# Patient Record
Sex: MALE | Race: WHITE | NOT HISPANIC OR LATINO | Employment: OTHER | URBAN - METROPOLITAN AREA
[De-identification: names, ages, dates, MRNs, and addresses within clinical notes are randomized per-mention and may not be internally consistent; named-entity substitution may affect disease eponyms.]

---

## 2023-02-10 ENCOUNTER — OFFICE VISIT (OUTPATIENT)
Dept: CARDIAC SURGERY | Facility: CLINIC | Age: 63
End: 2023-02-10

## 2023-02-10 VITALS
HEART RATE: 60 BPM | BODY MASS INDEX: 26.63 KG/M2 | SYSTOLIC BLOOD PRESSURE: 132 MMHG | OXYGEN SATURATION: 94 % | DIASTOLIC BLOOD PRESSURE: 70 MMHG | HEIGHT: 69 IN | WEIGHT: 179.8 LBS

## 2023-02-10 DIAGNOSIS — R93.1 ELEVATED CORONARY ARTERY CALCIUM SCORE: ICD-10-CM

## 2023-02-10 DIAGNOSIS — E78.2 MIXED HYPERLIPIDEMIA: ICD-10-CM

## 2023-02-10 DIAGNOSIS — Z82.49 FAMILY HISTORY OF CARDIOVASCULAR DISEASE: Primary | ICD-10-CM

## 2023-02-10 RX ORDER — ERGOCALCIFEROL 1.25 MG/1
50000 CAPSULE ORAL WEEKLY
COMMUNITY
Start: 2023-02-01

## 2023-02-10 RX ORDER — LOSARTAN POTASSIUM 50 MG/1
TABLET ORAL
COMMUNITY
Start: 2023-02-07

## 2023-02-10 RX ORDER — EZETIMIBE 10 MG/1
10 TABLET ORAL DAILY
COMMUNITY
Start: 2023-01-19

## 2023-02-10 RX ORDER — ROSUVASTATIN CALCIUM 40 MG/1
40 TABLET, COATED ORAL DAILY
COMMUNITY
Start: 2023-01-21

## 2023-02-10 NOTE — PROGRESS NOTES
Cardiology Consultation  Interventional Cardiology and [address] [place] [address],[address]  1960  [id number]  CARDIOVASC PHYSICIAN  [address] [address]  [phone]  [phone]    1  Family history of cardiovascular disease  POCT ECG    Comprehensive metabolic panel    Lipid panel      2  Mixed hyperlipidemia  Comprehensive metabolic panel    Lipid panel      3  Elevated coronary artery calcium score               Discussion/Summary    1  H/o mild CAD by cor calcium score >900 years ago in [place]  2  Hyperlipidemia with h/o low HDL   3  Essential hypertension, controlled    Plan  1) continue exercise and sensible/Mediterranean diet  2) will obtain records prior cardiologist in [place]  3) 45min spent with pt counseling on discussion  hyperlipidemia, CAD, manifestations, prevention vascular disease      History:     42-year-old male here for establishing cardiovascular care    Saw cardiology in [address] [place] [address] previously    Remote history of coronary calcium scoring which she states was over 900 but under 1000  Has been on combination rosuvastatin and Zetia  Approximately 7 years ago was diagnosed with essential hypertension at the time of daughter attempting to commit suicide which was an anxious time for obvious reasons  He has not been off blood pressure medicine since but it has been well controlled    No history of clinical vascular disease    Mother had carotid artery stenosis    No history rheumatic fever or childhood murmur  No prior syncope  Feels well  Patient Active Problem List   Diagnosis   • Mixed hyperlipidemia   • Elevated coronary artery calcium score     History reviewed  No pertinent past medical history    Social History     Socioeconomic History   • Marital status: /Civil Union     Spouse name: Not on file   • Number of children: Not on file   • Years of education: Not on file   • Highest education level: Not on file   Occupational History   • Not on file   Tobacco Use   • Smoking status: Never     Passive exposure: Never   • Smokeless tobacco: Never   Vaping Use   • Vaping Use: Never used   Substance and Sexual Activity   • Alcohol use: Yes     Alcohol/week: 2 0 standard drinks     Types: 1 Glasses of wine, 1 Cans of beer per week     Comment: social   • Drug use: Never   • Sexual activity: Not on file   Other Topics Concern   • Not on file   Social History Narrative   • Not on file     Social Determinants of Health     Financial Resource Strain: Not on file   Food Insecurity: Not on file   Transportation Needs: Not on file   Physical Activity: Not on file   Stress: Not on file   Social Connections: Not on file   Intimate Partner Violence: Not on file   Housing Stability: Not on file      Family History   Problem Relation Age of Onset   • Heart attack Mother    • Hyperlipidemia Mother    • Hypertension Mother    • Heart disease Mother    • Hyperlipidemia Brother      History reviewed  No pertinent surgical history      Current Outpatient Medications:   •  ergocalciferol (VITAMIN D2) 50,000 units, Take 50,000 Units by mouth once a week, Disp: , Rfl:   •  ezetimibe (ZETIA) 10 mg tablet, Take 10 mg by mouth daily, Disp: , Rfl:   •  losartan (COZAAR) 50 mg tablet, , Disp: , Rfl:   •  rosuvastatin (CRESTOR) 40 MG tablet, Take 40 mg by mouth daily, Disp: , Rfl:   No Known Allergies    Social, Family and medication history as listed, reviewed and updated as necessary    Labs: No results found for: NA, K, CL, CO2, BUN, CREATININE, GLUCOSE, CALCIUM    No results found for: WBC, HGB, HCT, PLT    No results found for: CHOL  No results found for: HDL  No results found for: LDLCALC  No results found for: TRIG  No results found for: LDLDIRECT    No results found for: ALT, AST          No results found for: NTBNP    No results found for: HGBA1C    Imaging: Reviewed in epic      Review of Systems:  14 systems reviewed and negative with exception of the above       PHYSICAL EXAM:        Vitals:    02/10/23 1357   BP: 132/70   Pulse: 60   SpO2: 94%     Body mass index is 26 55 kg/m²  Weight (last 2 days)     Date/Time Weight    02/10/23 1357 81 6 (179 8)             Gen: No acute distress  HEENT: anicteric, mucous membranes moist  Neck: supple, no jugular venous distention, or carotid bruit  Heart: regular, normal s1 and s2, no murmur/rub or gallop  Lungs :clear to auscultation bilaterally, no rales/rhonchi or wheeze  Abdomen: soft nontender, normoactive bowel sounds, no organomegaly  Ext: warm and perfused, normal femoral pulses, no edema, or clubbing  Skin: warm, no rashes  Neuro: AAO x 3, no focal findings  Psychiatric: normal affect  Musculoskeletal: no obvious joint deformities  This note was completed in part utilizing [employer] direct voice recognition software  Grammatical errors, random word insertion, spelling mistakes, and incomplete sentences may be an occasional consequence of the system secondary to software limitations, ambient noise and hardware issues  At the time of dictation, efforts were made to edit, clarify and /or correct errors  Please read the chart carefully and recognize, using context, where substitutions have occurred  If you have any questions or concerns about the context, text or information contained within the body of this dictation, please contact myself, the provider, for further clarification

## 2024-02-29 ENCOUNTER — TELEPHONE (OUTPATIENT)
Dept: UROLOGY | Facility: AMBULATORY SURGERY CENTER | Age: 64
End: 2024-02-29

## 2024-02-29 NOTE — TELEPHONE ENCOUNTER
TT from Dr. Cloud about a patient being referred for elevated PSA around 6. Patient to be seen as soon as possible.    Called and spoke with patient. He will be out of the area tomorrow until next Sunday (3/10). Scheduled 3/11 at [facility]. Patient states his PSA was just done with his yearly physical and was just above 6. He will bring records with to his appt. Confirmed office address, time and date.

## 2024-03-11 ENCOUNTER — OFFICE VISIT (OUTPATIENT)
Dept: UROLOGY | Facility: CLINIC | Age: 64
End: 2024-03-11
Payer: COMMERCIAL

## 2024-03-11 VITALS
BODY MASS INDEX: 25.83 KG/M2 | HEIGHT: 69 IN | OXYGEN SATURATION: 96 % | SYSTOLIC BLOOD PRESSURE: 136 MMHG | WEIGHT: 174.4 LBS | HEART RATE: 65 BPM | DIASTOLIC BLOOD PRESSURE: 78 MMHG

## 2024-03-11 DIAGNOSIS — R97.20 ELEVATED PSA: ICD-10-CM

## 2024-03-11 PROCEDURE — 99204 OFFICE O/P NEW MOD 45 MIN: CPT | Performed by: UROLOGY

## 2024-03-11 RX ORDER — TESTOSTERONE 10 MG/.5G
GEL, METERED TOPICAL
COMMUNITY
Start: 2023-03-01

## 2024-03-11 NOTE — PROGRESS NOTES
3/11/2024    Mic Richter  1960  [id number]        Assessment  Elevated PSA of 6.71, family history of prostate cancer, elevated PSA velocity, history of hypogonadism on testosterone replacement therapy, asymptomatic incidental right inguinal hernia      Discussion  Today I discussed with the patient the significance of the elevated PSA in addition to a positive family history for prostate cancer puts the patient at a greater risk for prostate cancer.  I recommend repeating the PSA level at this time.  In addition I have preemptively scheduled an MRI of the prostate for restratification for prostate cancer.  I provided the patient with reassurance that his prostate is normal on digital rectal examination today, the MRI will also help to obtain prostate size for PSA density.  I recommend that he continue to hold testosterone replacement therapy until after MRI of the prostate and further discussion regarding risk for prostate cancer.  If the MRI reveals a high PI-RADS score we then discussed possibly performing a fusion biopsy.  I would hold on intervention for his asymptomatic right inguinal hernia as the patient was unaware of this finding and has no pain or discomfort.      History of Present Illness  63 y.o. male with a history of an elevated PSA of 6.71( 2/17/23) which is an increase from a previous 1.9.  Testing was performed at [facility].  Patient disclosed a family history of prostate cancer with his father having prostate cancer, which was successfully treated. Patient denies any lower urinary tract symptoms and reports going to the restroom 1 to 2 times in the middle of the night. Denies any sexual dysfunction. Patient disclosed that he was previously using testosterone replacement therapy due to low testosterone. However, the patient notes that he has recently stopped using it when his elevated PSA was identified.          AUA Symptom Score  AUA SYMPTOM SCORE      Flowsheet Row Most Recent  Value   AUA SYMPTOM SCORE    How often have you had a sensation of not emptying your bladder completely after you finished urinating? 1 (P)    How often have you had to urinate again less than two hours after you finished urinating? 1 (P)    How often have you found you stopped and started again several times when you urinate? 2 (P)    How often have you found it difficult to postpone urination? 0 (P)    How often have you had a weak urinary stream? 2 (P)    How often have you had to push or strain to begin urination? 1 (P)    How many times did you most typically get up to urinate from the time you went to bed at night until the time you got up in the morning? 3 (P)    Quality of Life: If you were to spend the rest of your life with your urinary condition just the way it is now, how would you feel about that? 1 (P)    AUA SYMPTOM SCORE 10 (P)             Review of Systems  Review of Systems   Constitutional: Negative.  Negative for chills and fever.   HENT: Negative.  Negative for ear pain and sore throat.    Eyes: Negative.  Negative for pain and visual disturbance.   Respiratory: Negative.  Negative for cough and shortness of breath.    Cardiovascular: Negative.  Negative for chest pain and palpitations.   Gastrointestinal: Negative.  Negative for abdominal pain and vomiting.   Endocrine: Negative.    Genitourinary:  Negative for dysuria and hematuria.        Per HPI   Musculoskeletal: Negative.  Negative for arthralgias and back pain.   Skin: Negative.  Negative for color change and rash.   Allergic/Immunologic: Negative.    Neurological: Negative.  Negative for seizures and syncope.   Hematological: Negative.    Psychiatric/Behavioral: Negative.     All other systems reviewed and are negative.        Past Medical History  No past medical history on file.    Past Social History  No past surgical history on file.    Past Family History  Family History   Problem Relation Age of Onset    Heart attack Mother      Hyperlipidemia Mother     Hypertension Mother     Heart disease Mother     Hyperlipidemia Brother        Past Social history  Social History     Socioeconomic History    Marital status: /Civil Union     Spouse name: Not on file    Number of children: Not on file    Years of education: Not on file    Highest education level: Not on file   Occupational History    Not on file   Tobacco Use    Smoking status: Never     Passive exposure: Never    Smokeless tobacco: Never   Vaping Use    Vaping status: Never Used   Substance and Sexual Activity    Alcohol use: Yes     Alcohol/week: 2.0 standard drinks of alcohol     Types: 1 Glasses of wine, 1 Cans of beer per week     Comment: social    Drug use: Never    Sexual activity: Not on file   Other Topics Concern    Not on file   Social History Narrative    Not on file     Social Determinants of Health     Financial Resource Strain: Not on file   Food Insecurity: Not on file   Transportation Needs: Not on file   Physical Activity: Not on file   Stress: Not on file   Social Connections: Not on file   Intimate Partner Violence: Not on file   Housing Stability: Not on file       Current Medications  Current Outpatient Medications   Medication Sig Dispense Refill    ergocalciferol (VITAMIN D2) 50,000 units Take 50,000 Units by mouth once a week      ezetimibe (ZETIA) 10 mg tablet Take 10 mg by mouth daily      losartan (COZAAR) 50 mg tablet       rosuvastatin (CRESTOR) 40 MG tablet Take 40 mg by mouth daily       No current facility-administered medications for this visit.       Allergies  No Known Allergies    Past Medical History, Social History, Family History, medications and allergies were reviewed.    Vitals  There were no vitals filed for this visit.    Physical Exam    On examination he is in no acute distress.  His abdomen is soft nontender nondistended.  Right lower quadrant abdominal scar from a pocket for an amputated finger is identified.  His left thumb has  "since been sewn back on into proper anatomic position.  Phallus is normal.  Testes are normal.  A right inguinal hernia is appreciated and easily reducible.  There is a 1 cm right epididymal head cyst.  Digital rectal examination reveals a benign 35 to 40 g prostate which is soft without nodularity.  Skin is warm.  Extremities without edema.  Neurologic is grossly intact and nonfocal.  Gait normal.  Affect normal      Results  No results found for: \"PSA\"  No results found for: \"GLUCOSE\", \"CALCIUM\", \"NA\", \"K\", \"CO2\", \"CL\", \"BUN\", \"CREATININE\"  No results found for: \"WBC\", \"HGB\", \"HCT\", \"MCV\", \"PLT\"      Office Urine Dip  No results found for this or any previous visit (from the past 1 hour(s)).]            "

## 2024-03-11 NOTE — LETTER
March 11, 2024     Dorothy Jc MD  [address] [facility]  [place] [address]    Patient: Mic Richter   YOB: 1960   Date of Visit: 3/11/2024       Dear Dr. Jc:    Thank you for referring Mic Richter to me for evaluation. Below are my notes for this consultation.    If you have questions, please do not hesitate to call me. I look forward to following your patient along with you.         Sincerely,        Terrance Cloud MD        CC: No Recipients    Terrance Cloud MD  3/11/2024 12:33 PM  Sign when Signing Visit  3/11/2024    Mic Richter  1960  [id number]        Assessment  Elevated PSA of 6.71, family history of prostate cancer, elevated PSA velocity, history of hypogonadism on testosterone replacement therapy, asymptomatic incidental right inguinal hernia      Discussion  Today I discussed with the patient the significance of the elevated PSA in addition to a positive family history for prostate cancer puts the patient at a greater risk for prostate cancer.  I recommend repeating the PSA level at this time.  In addition I have preemptively scheduled an MRI of the prostate for restratification for prostate cancer.  I provided the patient with reassurance that his prostate is normal on digital rectal examination today, the MRI will also help to obtain prostate size for PSA density.  I recommend that he continue to hold testosterone replacement therapy until after MRI of the prostate and further discussion regarding risk for prostate cancer.  If the MRI reveals a high PI-RADS score we then discussed possibly performing a fusion biopsy.  I would hold on intervention for his asymptomatic right inguinal hernia as the patient was unaware of this finding and has no pain or discomfort.      History of Present Illness  63 y.o. male with a history of an elevated PSA of 6.71( 2/17/23) which is an increase from a previous 1.9.  Testing was performed at [facility]  laboratory.  Patient disclosed a family history of prostate cancer with his father having prostate cancer, which was successfully treated. Patient denies any lower urinary tract symptoms and reports going to the restroom 1 to 2 times in the middle of the night. Denies any sexual dysfunction. Patient disclosed that he was previously using testosterone replacement therapy due to low testosterone. However, the patient notes that he has recently stopped using it when his elevated PSA was identified.          AUA Symptom Score  AUA SYMPTOM SCORE      Flowsheet Row Most Recent Value   AUA SYMPTOM SCORE    How often have you had a sensation of not emptying your bladder completely after you finished urinating? 1 (P)    How often have you had to urinate again less than two hours after you finished urinating? 1 (P)    How often have you found you stopped and started again several times when you urinate? 2 (P)    How often have you found it difficult to postpone urination? 0 (P)    How often have you had a weak urinary stream? 2 (P)    How often have you had to push or strain to begin urination? 1 (P)    How many times did you most typically get up to urinate from the time you went to bed at night until the time you got up in the morning? 3 (P)    Quality of Life: If you were to spend the rest of your life with your urinary condition just the way it is now, how would you feel about that? 1 (P)    AUA SYMPTOM SCORE 10 (P)             Review of Systems  Review of Systems   Constitutional: Negative.  Negative for chills and fever.   HENT: Negative.  Negative for ear pain and sore throat.    Eyes: Negative.  Negative for pain and visual disturbance.   Respiratory: Negative.  Negative for cough and shortness of breath.    Cardiovascular: Negative.  Negative for chest pain and palpitations.   Gastrointestinal: Negative.  Negative for abdominal pain and vomiting.   Endocrine: Negative.    Genitourinary:  Negative for dysuria and  hematuria.        Per HPI   Musculoskeletal: Negative.  Negative for arthralgias and back pain.   Skin: Negative.  Negative for color change and rash.   Allergic/Immunologic: Negative.    Neurological: Negative.  Negative for seizures and syncope.   Hematological: Negative.    Psychiatric/Behavioral: Negative.     All other systems reviewed and are negative.        Past Medical History  No past medical history on file.    Past Social History  No past surgical history on file.    Past Family History  Family History   Problem Relation Age of Onset   • Heart attack Mother    • Hyperlipidemia Mother    • Hypertension Mother    • Heart disease Mother    • Hyperlipidemia Brother        Past Social history  Social History     Socioeconomic History   • Marital status: /Civil Union     Spouse name: Not on file   • Number of children: Not on file   • Years of education: Not on file   • Highest education level: Not on file   Occupational History   • Not on file   Tobacco Use   • Smoking status: Never     Passive exposure: Never   • Smokeless tobacco: Never   Vaping Use   • Vaping status: Never Used   Substance and Sexual Activity   • Alcohol use: Yes     Alcohol/week: 2.0 standard drinks of alcohol     Types: 1 Glasses of wine, 1 Cans of beer per week     Comment: social   • Drug use: Never   • Sexual activity: Not on file   Other Topics Concern   • Not on file   Social History Narrative   • Not on file     Social Determinants of Health     Financial Resource Strain: Not on file   Food Insecurity: Not on file   Transportation Needs: Not on file   Physical Activity: Not on file   Stress: Not on file   Social Connections: Not on file   Intimate Partner Violence: Not on file   Housing Stability: Not on file       Current Medications  Current Outpatient Medications   Medication Sig Dispense Refill   • ergocalciferol (VITAMIN D2) 50,000 units Take 50,000 Units by mouth once a week     • ezetimibe (ZETIA) 10 mg tablet Take 10  "mg by mouth daily     • losartan (COZAAR) 50 mg tablet      • rosuvastatin (CRESTOR) 40 MG tablet Take 40 mg by mouth daily       No current facility-administered medications for this visit.       Allergies  No Known Allergies    Past Medical History, Social History, Family History, medications and allergies were reviewed.    Vitals  There were no vitals filed for this visit.    Physical Exam    On examination he is in no acute distress.  His abdomen is soft nontender nondistended.  Right lower quadrant abdominal scar from a pocket for an amputated finger is identified.  His left thumb has since been sewn back on into proper anatomic position.  Phallus is normal.  Testes are normal.  A right inguinal hernia is appreciated and easily reducible.  There is a 1 cm right epididymal head cyst.  Digital rectal examination reveals a benign 35 to 40 g prostate which is soft without nodularity.  Skin is warm.  Extremities without edema.  Neurologic is grossly intact and nonfocal.  Gait normal.  Affect normal      Results  No results found for: \"PSA\"  No results found for: \"GLUCOSE\", \"CALCIUM\", \"NA\", \"K\", \"CO2\", \"CL\", \"BUN\", \"CREATININE\"  No results found for: \"WBC\", \"HGB\", \"HCT\", \"MCV\", \"PLT\"      Office Urine Dip  No results found for this or any previous visit (from the past 1 hour(s)).]            "

## 2024-03-12 ENCOUNTER — APPOINTMENT (OUTPATIENT)
Dept: LAB | Facility: CLINIC | Age: 64
End: 2024-03-12
Payer: COMMERCIAL

## 2024-03-12 DIAGNOSIS — R97.20 ELEVATED PSA: ICD-10-CM

## 2024-03-12 LAB
ANION GAP SERPL CALCULATED.3IONS-SCNC: 8 MMOL/L (ref 4–13)
BUN SERPL-MCNC: 15 MG/DL (ref 5–25)
CALCIUM SERPL-MCNC: 9.2 MG/DL (ref 8.4–10.2)
CHLORIDE SERPL-SCNC: 106 MMOL/L (ref 96–108)
CO2 SERPL-SCNC: 26 MMOL/L (ref 21–32)
CREAT SERPL-MCNC: 0.86 MG/DL (ref 0.6–1.3)
GFR SERPL CREATININE-BSD FRML MDRD: 93 ML/MIN/1.73SQ M
GLUCOSE P FAST SERPL-MCNC: 97 MG/DL (ref 65–99)
POTASSIUM SERPL-SCNC: 4.4 MMOL/L (ref 3.5–5.3)
PSA SERPL-MCNC: 3.35 NG/ML (ref 0–4)
SODIUM SERPL-SCNC: 140 MMOL/L (ref 135–147)

## 2024-03-12 PROCEDURE — 36415 COLL VENOUS BLD VENIPUNCTURE: CPT

## 2024-03-12 PROCEDURE — 80048 BASIC METABOLIC PNL TOTAL CA: CPT

## 2024-03-12 PROCEDURE — 84153 ASSAY OF PSA TOTAL: CPT

## 2024-03-25 ENCOUNTER — HOSPITAL ENCOUNTER (OUTPATIENT)
Facility: MEDICAL CENTER | Age: 64
Discharge: HOME | End: 2024-03-25
Payer: COMMERCIAL

## 2024-03-25 DIAGNOSIS — R97.20 ELEVATED PSA: ICD-10-CM

## 2024-03-25 PROCEDURE — 76377 3D RENDER W/INTRP POSTPROCES: CPT

## 2024-03-25 PROCEDURE — A9585 GADOBUTROL INJECTION: HCPCS

## 2024-03-25 PROCEDURE — 72197 MRI PELVIS W/O & W/DYE: CPT

## 2024-03-25 RX ORDER — GADOBUTROL 604.72 MG/ML
7 INJECTION INTRAVENOUS
Status: COMPLETED | OUTPATIENT
Start: 2024-03-25 | End: 2024-03-25

## 2024-03-25 RX ADMIN — GADOBUTROL 7 ML: 604.72 INJECTION INTRAVENOUS at 10:49

## 2024-04-03 ENCOUNTER — OFFICE VISIT (OUTPATIENT)
Dept: CARDIAC SURGERY | Facility: CLINIC | Age: 64
End: 2024-04-03
Payer: COMMERCIAL

## 2024-04-03 ENCOUNTER — TELEPHONE (OUTPATIENT)
Age: 64
End: 2024-04-03

## 2024-04-03 VITALS
OXYGEN SATURATION: 99 % | RESPIRATION RATE: 18 BRPM | WEIGHT: 175 LBS | SYSTOLIC BLOOD PRESSURE: 120 MMHG | DIASTOLIC BLOOD PRESSURE: 74 MMHG | HEIGHT: 69 IN | HEART RATE: 57 BPM | BODY MASS INDEX: 25.92 KG/M2

## 2024-04-03 DIAGNOSIS — Z82.49 FAMILY HISTORY OF CARDIOVASCULAR DISEASE: Primary | ICD-10-CM

## 2024-04-03 DIAGNOSIS — R93.1 ELEVATED CORONARY ARTERY CALCIUM SCORE: ICD-10-CM

## 2024-04-03 DIAGNOSIS — E78.2 MIXED HYPERLIPIDEMIA: ICD-10-CM

## 2024-04-03 PROCEDURE — 99214 OFFICE O/P EST MOD 30 MIN: CPT | Performed by: INTERNAL MEDICINE

## 2024-04-03 PROCEDURE — 93000 ELECTROCARDIOGRAM COMPLETE: CPT | Performed by: INTERNAL MEDICINE

## 2024-04-03 RX ORDER — ASPIRIN 81 MG/1
81 TABLET ORAL DAILY
COMMUNITY

## 2024-04-03 RX ORDER — BLOOD-GLUCOSE SENSOR
EACH MISCELLANEOUS
COMMUNITY
Start: 2024-03-15

## 2024-04-03 NOTE — PROGRESS NOTES
Cardiology Consultation  [facility]      Mic Richter  1960  [id number]  CARDIOVASC PHYSICIAN  [address] [place] [id number]  [phone]  [phone]    1. Family history of cardiovascular disease  POCT ECG      2. Elevated coronary artery calcium score        3. Mixed hyperlipidemia               Discussion/Summary:    1. H/o mild CAD by cor calcium score >900 years ago in [facility]  2. Hyperlipidemia with overall controlled LDL and normal CRP  3. Essential hypertension, controlled    Plan:   1) patient overall doing well.  No cardiac testing or changes in medications are required from my standpoint at this time.  Encouraged continued sensible/Mediterranean type diet and daily activity/exercise.  Annual follow-up arranged.      Face to face pio: 15minutes  Review data: 5 minutes  Documentation: 10 minutes    History:     Mr. Richter is a 63-year-old male here for cardiology follow up.     Remote history of coronary calcium scoring which she states was over 900 but under 1000.    Has been on combination rosuvastatin and Zetia.  Recent cholesterol profile with total cholesterol 141, LDL 85, HDL 43.  CRP less than 0.3.    Blood Pressure has been controlled on Cozaar.    He feels well.  Plays paddle tennis and golfs and walks when he golfs.  No symptoms of shortness of breath PND orthopnea.  No chest discomfort.    Patient Active Problem List   Diagnosis    Mixed hyperlipidemia    Elevated coronary artery calcium score     History reviewed. No pertinent past medical history.  Social History     Socioeconomic History    Marital status: /Civil Union     Spouse name: Not on file    Number of children: Not on file    Years of education: Not on file    Highest education level: Not on file   Occupational History    Not on file   Tobacco Use    Smoking status: Never     Passive exposure: Never    Smokeless tobacco: Never   Vaping Use    Vaping status: Never Used  "  Substance and Sexual Activity    Alcohol use: Yes     Alcohol/week: 2.0 standard drinks of alcohol     Types: 1 Glasses of wine, 1 Cans of beer per week     Comment: social    Drug use: Never    Sexual activity: Not on file   Other Topics Concern    Not on file   Social History Narrative    Not on file     Social Determinants of Health     Financial Resource Strain: Not on file   Food Insecurity: Not on file   Transportation Needs: Not on file   Physical Activity: Not on file   Stress: Not on file   Social Connections: Not on file   Intimate Partner Violence: Not on file   Housing Stability: Not on file      Family History   Problem Relation Age of Onset    Heart attack Mother     Hyperlipidemia Mother     Hypertension Mother     Heart disease Mother     Hyperlipidemia Brother      History reviewed. No pertinent surgical history.    Current Outpatient Medications:     aspirin (ECOTRIN LOW STRENGTH) 81 mg EC tablet, Take 81 mg by mouth daily, Disp: , Rfl:     Continuous Blood Gluc Sensor (FreeStyle [place] 3 Sensor) MISC, HERNESTO 3 APPLY TO AFFECTED AREA SQ DAILY 14 DAYS, Disp: , Rfl:     ergocalciferol (VITAMIN D2) 50,000 units, Take 50,000 Units by mouth once a week, Disp: , Rfl:     ezetimibe (ZETIA) 10 mg tablet, Take 10 mg by mouth daily, Disp: , Rfl:     losartan (COZAAR) 50 mg tablet, , Disp: , Rfl:     rosuvastatin (CRESTOR) 40 MG tablet, Take 40 mg by mouth daily, Disp: , Rfl:     Testosterone 10 MG/ACT (2%) GEL, , Disp: , Rfl:   No Known Allergies    Social, Family and medication history as listed, reviewed and updated as necessary    Labs:   Lab Results   Component Value Date    K 4.4 03/12/2024     03/12/2024    CO2 26 03/12/2024    BUN 15 03/12/2024    CREATININE 0.86 03/12/2024    CALCIUM 9.2 03/12/2024       No results found for: \"WBC\", \"HGB\", \"HCT\", \"PLT\"    No results found for: \"CHOL\"  No results found for: \"HDL\"  No results found for: \"LDLCALC\"  No results found for: \"TRIG\"  No results found " "for: \"LDLDIRECT\"    No results found for: \"ALT\", \"AST\", \"ALKPHOS\"          No results found for: \"NTBNP\"    No results found for: \"HGBA1C\"    Imaging: Reviewed in epic      Review of Systems:  14 systems reviewed and negative with exception of the above       PHYSICAL EXAM:        Vitals:    04/03/24 1241   BP: 120/74   Pulse: 57   Resp: 18   SpO2: 99%     Body mass index is 25.84 kg/m².  Weight (last 2 days)       Date/Time Weight    04/03/24 1241 79.4 (175)               Gen: No acute distress  HEENT: anicteric, mucous membranes moist  Neck: supple, no jugular venous distention, or carotid bruit  Heart: regular, normal s1 and s2, no murmur/rub or gallop  Lungs :clear to auscultation bilaterally, no rales/rhonchi or wheeze  Abdomen: soft nontender, normoactive bowel sounds, no organomegaly  Ext: warm and perfused, normal femoral pulses, no edema, or clubbing  Skin: warm, no rashes  Neuro: AAO x 3, no focal findings  Psychiatric: normal affect  Musculoskeletal: no obvious joint deformities.        This note was completed in part utilizing [employer] direct voice recognition software.   Grammatical errors, random word insertion, spelling mistakes, and incomplete sentences may be an occasional consequence of the system secondary to software limitations, ambient noise and hardware issues. At the time of dictation, efforts were made to edit, clarify and /or correct errors.  Please read the chart carefully and recognize, using context, where substitutions have occurred.  If you have any questions or concerns about the context, text or information contained within the body of this dictation, please contact myself, the provider, for further clarification.       "

## 2024-04-03 NOTE — TELEPHONE ENCOUNTER
Patient only seen once.  Office note, MRI and PSA report faxed to [phone] and received confirmation.

## 2024-04-03 NOTE — TELEPHONE ENCOUNTER
Carmenza from [facility] office was calling in regards to the fax requested from earlier today. They did receive the PSA and OV notes. But they did not get the MRI results. Can this please be faxed to [phone]

## 2024-04-03 NOTE — TELEPHONE ENCOUNTER
Carmenza from [facility] calling to request the mutual pt's MRI, PSA and recent OV notes be faxed to their office directly. Would like to have this information prior to his 4/17 appt.   Fax number: [phone]

## 2024-04-04 ENCOUNTER — TELEPHONE (OUTPATIENT)
Dept: UROLOGY | Facility: AMBULATORY SURGERY CENTER | Age: 64
End: 2024-04-04

## 2024-04-04 DIAGNOSIS — R97.20 ELEVATED PSA: Primary | ICD-10-CM

## 2024-06-11 DIAGNOSIS — R93.1 ELEVATED CORONARY ARTERY CALCIUM SCORE: Primary | ICD-10-CM

## 2024-06-11 RX ORDER — LOSARTAN POTASSIUM 50 MG/1
50 TABLET ORAL DAILY
Qty: 90 TABLET | Refills: 1 | Status: SHIPPED | OUTPATIENT
Start: 2024-06-11

## 2024-06-11 NOTE — TELEPHONE ENCOUNTER
Reason for call:   [x] Refill   [] Prior Auth  [] Other:     Office:   [] PCP/Provider -   [x] Specialty/Provider -   Chris Bender,   Cardiology    Medication:   losartan (COZAAR) 50 mg tablet- one daily # 90      Pharmacy:[facility]/pharmacy #[id number] - [place] [address]     Does the patient have enough for 3 days?   \\ Yes   [] No - Send as HP to POD

## 2024-07-16 ENCOUNTER — APPOINTMENT (OUTPATIENT)
Dept: LAB | Facility: CLINIC | Age: 64
End: 2024-07-16
Payer: COMMERCIAL

## 2024-07-16 DIAGNOSIS — R97.20 ELEVATED PSA: ICD-10-CM

## 2024-07-16 LAB — PSA SERPL-MCNC: 2.2 NG/ML (ref 0–4)

## 2024-07-16 PROCEDURE — 84153 ASSAY OF PSA TOTAL: CPT

## 2024-07-16 PROCEDURE — 36415 COLL VENOUS BLD VENIPUNCTURE: CPT

## 2024-09-24 ENCOUNTER — TELEPHONE (OUTPATIENT)
Age: 64
End: 2024-09-24

## 2024-10-01 ENCOUNTER — OFFICE VISIT (OUTPATIENT)
Dept: UROLOGY | Facility: AMBULATORY SURGERY CENTER | Age: 64
End: 2024-10-01
Payer: COMMERCIAL

## 2024-10-01 VITALS
HEIGHT: 69 IN | OXYGEN SATURATION: 98 % | SYSTOLIC BLOOD PRESSURE: 112 MMHG | BODY MASS INDEX: 25.92 KG/M2 | DIASTOLIC BLOOD PRESSURE: 68 MMHG | RESPIRATION RATE: 16 BRPM | WEIGHT: 175 LBS | HEART RATE: 85 BPM

## 2024-10-01 DIAGNOSIS — Z12.5 SCREENING FOR PROSTATE CANCER: Primary | ICD-10-CM

## 2024-10-01 PROCEDURE — 99213 OFFICE O/P EST LOW 20 MIN: CPT

## 2024-10-01 NOTE — ASSESSMENT & PLAN NOTE
Patient has a positive family history of prostate cancer  Patient had an elevated PSA as high of 6.71 which prompted a multiparametric MRI of the prostate.  Multiparametric MRI of the prostate was performed 3/25/2024 and patient was graded as PI-RADS category 2 with a prostate size of 37 g.  The patient has had 2 PSAs tested since his MRI and he continues to drop to his normal baseline.  First on 3/25/2024 returned as 3.35 and again on 7/16/2024 which returned as 2.198.  SONAL performed at his last office visit which was noted to be palpably benign.  We discussed that with his PSA dropping towards his previous baseline as well as a prostate MRI grade of 2, that the patient can return to annual prostate cancer screening with a PSA yearly and office follow-up with a SONAL.  Additionally, the patient can reinitiate his testosterone placement therapy which she was advised to stop until after his MRI of the prostate.  Patient will obtain a PSA 1 year from his last and follow-up in the office at that time to undergo a SONAL.    PSA Trend:  2.198 - 7/16/24  3.35 - 3/25/24  6.71 - 2/17/23

## 2024-10-01 NOTE — PROGRESS NOTES
10/1/2024      Assessment and Plan    64 y.o. male managed by Dr. Cloud    Screening for prostate cancer  Patient has a positive family history of prostate cancer  Patient had an elevated PSA as high of 6.71 which prompted a multiparametric MRI of the prostate.  Multiparametric MRI of the prostate was performed 3/25/2024 and patient was graded as PI-RADS category 2 with a prostate size of 37 g.  The patient has had 2 PSAs tested since his MRI and he continues to drop to his normal baseline.  First on 3/25/2024 returned as 3.35 and again on 7/16/2024 which returned as 2.198.  SONAL performed at his last office visit which was noted to be palpably benign.  We discussed that with his PSA dropping towards his previous baseline as well as a prostate MRI grade of 2, that the patient can return to annual prostate cancer screening with a PSA yearly and office follow-up with a SONAL.  Additionally, the patient can reinitiate his testosterone placement therapy which she was advised to stop until after his MRI of the prostate.  Patient will obtain a PSA 1 year from his last and follow-up in the office at that time to undergo a SONAL.    PSA Trend:  2.198 - 7/16/24  3.35 - 3/25/24  6.71 - 2/17/23         History of Present Illness  Mic Richter is a 64 y.o. male here for evaluation of history of an elevated PSA, history of hypogonadism on testosterone replacement therapy, and asymptomatic right inguinal hernia.  Patient has a positive family history of prostate cancer.  Patient's most recent PSA was performed 7/16/2024 and found to be 2.198.  Patient underwent multiparametric MRI of the prostate on 3/25/2024 which graded the patient has PI-RADS category 2 with a calculated prostate size of 37 g.  Patient is currently on testosterone 10 Mg/ACT (2%) gel for his testosterone placement therapy.  Today, the patient is overall doing very well and offers no new lower urinary tract complaints.  AUA symptom score 11.  Patient denies  "dysuria, hematuria, flank pain, feelings of incomplete bladder emptying, or weakened urinary stream.        Review of Systems   Constitutional:  Negative for chills and fever.   HENT:  Negative for ear pain and sore throat.    Eyes:  Negative for pain and visual disturbance.   Respiratory:  Negative for cough and shortness of breath.    Cardiovascular:  Negative for chest pain and palpitations.   Gastrointestinal:  Negative for abdominal pain and vomiting.   Genitourinary:  Negative for decreased urine volume, difficulty urinating, dysuria, flank pain, frequency, hematuria and urgency.   Musculoskeletal:  Negative for arthralgias and back pain.   Skin:  Negative for color change and rash.   Neurological:  Negative for seizures and syncope.   All other systems reviewed and are negative.          AUA SYMPTOM SCORE      Flowsheet Row Most Recent Value   AUA SYMPTOM SCORE    How often have you had a sensation of not emptying your bladder completely after you finished urinating? 2 (P)     How often have you had to urinate again less than two hours after you finished urinating? 2 (P)     How often have you found you stopped and started again several times when you urinate? 3 (P)     How often have you found it difficult to postpone urination? 0 (P)     How often have you had a weak urinary stream? 3 (P)     How often have you had to push or strain to begin urination? 0 (P)     How many times did you most typically get up to urinate from the time you went to bed at night until the time you got up in the morning? 1 (P)     Quality of Life: If you were to spend the rest of your life with your urinary condition just the way it is now, how would you feel about that? 1 (P)     AUA SYMPTOM SCORE 11 (P)               Vitals  Vitals:    10/01/24 1435   BP: 112/68   BP Location: Left arm   Patient Position: Sitting   Cuff Size: Adult   Pulse: 85   Resp: 16   SpO2: 98%   Weight: 79.4 kg (175 lb)   Height: 5' 9\" (1.753 m) "       Physical Exam  Vitals reviewed.   Constitutional:       General: He is not in acute distress.     Appearance: Normal appearance. He is not ill-appearing.   HENT:      Head: Normocephalic and atraumatic.      Nose: Nose normal.   Eyes:      General: No scleral icterus.  Pulmonary:      Effort: No respiratory distress.   Abdominal:      General: Abdomen is flat. There is no distension.      Palpations: Abdomen is soft.      Tenderness: There is no abdominal tenderness.   Musculoskeletal:         General: Normal range of motion.      Cervical back: Normal range of motion.   Skin:     General: Skin is warm.      Coloration: Skin is not jaundiced.   Neurological:      Mental Status: He is alert and oriented to person, place, and time.      Gait: Gait normal.   Psychiatric:         Mood and Affect: Mood normal.         Behavior: Behavior normal.           Past History  History reviewed. No pertinent past medical history.  Social History     Socioeconomic History    Marital status: /Civil Union     Spouse name: None    Number of children: None    Years of education: None    Highest education level: None   Occupational History    None   Tobacco Use    Smoking status: Never     Passive exposure: Never    Smokeless tobacco: Never   Vaping Use    Vaping status: Never Used   Substance and Sexual Activity    Alcohol use: Yes     Alcohol/week: 2.0 standard drinks of alcohol     Types: 1 Glasses of wine, 1 Cans of beer per week     Comment: social    Drug use: Never    Sexual activity: None   Other Topics Concern    None   Social History Narrative    None     Social Determinants of Health     Financial Resource Strain: Not on file   Food Insecurity: Not on file   Transportation Needs: Not on file   Physical Activity: Not on file   Stress: Not on file   Social Connections: Not on file   Intimate Partner Violence: Not on file   Housing Stability: Not on file     Social History     Tobacco Use   Smoking Status Never     "Passive exposure: Never   Smokeless Tobacco Never     Family History   Problem Relation Age of Onset    Heart attack Mother     Hyperlipidemia Mother     Hypertension Mother     Heart disease Mother     Hyperlipidemia Brother        The following portions of the patient's history were reviewed and updated as appropriate: allergies, current medications, past medical history, past social history, past surgical history and problem list.    Results  No results found for this or any previous visit (from the past 1 hour(s)).]  Lab Results   Component Value Date    PSA 2.198 07/16/2024    PSA 3.35 03/12/2024     Lab Results   Component Value Date    CALCIUM 9.2 03/12/2024    K 4.4 03/12/2024    CO2 26 03/12/2024     03/12/2024    BUN 15 03/12/2024    CREATININE 0.86 03/12/2024     No results found for: \"WBC\", \"HGB\", \"HCT\", \"MCV\", \"PLT\"   "

## 2024-10-31 PROBLEM — Z12.5 SCREENING FOR PROSTATE CANCER: Status: RESOLVED | Noted: 2024-10-01 | Resolved: 2024-10-31

## 2024-11-29 DIAGNOSIS — R93.1 ELEVATED CORONARY ARTERY CALCIUM SCORE: Primary | ICD-10-CM

## 2024-11-29 RX ORDER — EZETIMIBE 10 MG/1
10 TABLET ORAL DAILY
Qty: 90 TABLET | Refills: 3 | Status: SHIPPED | OUTPATIENT
Start: 2024-11-29

## 2024-11-29 RX ORDER — LOSARTAN POTASSIUM 50 MG/1
50 TABLET ORAL DAILY
Qty: 90 TABLET | Refills: 1 | Status: SHIPPED | OUTPATIENT
Start: 2024-11-29

## 2024-11-29 NOTE — TELEPHONE ENCOUNTER
Reason for call: Chris Bender (cardiology)manage these medications!    [x] Refill   [] Prior Auth  [] Other:     Office:   [] PCP/Provider -   [x] Specialty/Provider -     Medication: ezetimibe (ZETIA) 10 mg tablet     Dose/Frequency: Take 10 mg by mouth daily,     Quantity: 90 Tablets      Medication    losartan (COZAAR) 50 mg tablet       Does/Frequency:losartan (COZAAR) 50 mg tablet     [id number] tablet     Pharmacy: [facility]/pharmacy [address] [place], [place] [address] [address] [phone]     Does the patient have enough for 3 days?   [x] Yes   [] No - Send as HP to POD

## 2024-12-05 ENCOUNTER — APPOINTMENT (OUTPATIENT)
Dept: RADIOLOGY | Facility: OTHER | Age: 64
End: 2024-12-05
Payer: COMMERCIAL

## 2024-12-05 ENCOUNTER — OFFICE VISIT (OUTPATIENT)
Dept: OBGYN CLINIC | Facility: OTHER | Age: 64
End: 2024-12-05
Payer: COMMERCIAL

## 2024-12-05 VITALS
SYSTOLIC BLOOD PRESSURE: 129 MMHG | DIASTOLIC BLOOD PRESSURE: 73 MMHG | HEIGHT: 69 IN | HEART RATE: 60 BPM | BODY MASS INDEX: 25.77 KG/M2 | WEIGHT: 174 LBS

## 2024-12-05 DIAGNOSIS — M75.52 SUBACROMIAL BURSITIS OF LEFT SHOULDER JOINT: ICD-10-CM

## 2024-12-05 DIAGNOSIS — M75.82 ROTATOR CUFF TENDONITIS, LEFT: Primary | ICD-10-CM

## 2024-12-05 DIAGNOSIS — M25.512 LEFT SHOULDER PAIN, UNSPECIFIED CHRONICITY: ICD-10-CM

## 2024-12-05 PROCEDURE — 99204 OFFICE O/P NEW MOD 45 MIN: CPT | Performed by: ORTHOPAEDIC SURGERY

## 2024-12-05 PROCEDURE — 20610 DRAIN/INJ JOINT/BURSA W/O US: CPT | Performed by: ORTHOPAEDIC SURGERY

## 2024-12-05 PROCEDURE — 73030 X-RAY EXAM OF SHOULDER: CPT

## 2024-12-05 RX ORDER — BUPIVACAINE HYDROCHLORIDE 2.5 MG/ML
2 INJECTION, SOLUTION INFILTRATION; PERINEURAL
Status: COMPLETED | OUTPATIENT
Start: 2024-12-05 | End: 2024-12-05

## 2024-12-05 RX ORDER — BETAMETHASONE SODIUM PHOSPHATE AND BETAMETHASONE ACETATE 3; 3 MG/ML; MG/ML
6 INJECTION, SUSPENSION INTRA-ARTICULAR; INTRALESIONAL; INTRAMUSCULAR; SOFT TISSUE
Status: COMPLETED | OUTPATIENT
Start: 2024-12-05 | End: 2024-12-05

## 2024-12-05 RX ADMIN — BETAMETHASONE SODIUM PHOSPHATE AND BETAMETHASONE ACETATE 6 MG: 3; 3 INJECTION, SUSPENSION INTRA-ARTICULAR; INTRALESIONAL; INTRAMUSCULAR; SOFT TISSUE at 10:30

## 2024-12-05 RX ADMIN — BUPIVACAINE HYDROCHLORIDE 2 ML: 2.5 INJECTION, SOLUTION INFILTRATION; PERINEURAL at 10:30

## 2024-12-05 NOTE — PROGRESS NOTES
"  Assessment  Diagnoses and all orders for this visit:    Rotator cuff tendonitis, left    Subacromial bursitis of left shoulder joint      Discussion and Plan:    The patient has an examination consistent with subacromial impingement syndrome of the left shoulder.  I have discussed with the patient the pathophysiology of this diagnosis and reviewed how the examination correlates with this diagnosis.  Treatment options were discussed at length and after discussing these treatment options, the patient elected for and received a subacromial injection of corticosteroid (as described in the procedure note) with a prescription for referral to physical therapy.  We will reevaluate the patient in 6-8 weeks.  If the symptoms fail to improve with this treatment the patient would be indicated for further imaging in the form of an MRI scan of the shoulder.     Subjective:   Patient ID: Mic Richter is a 64 y.o. male      HPI  The patient presents with a chief complaint of left shoulder pain.   The pain began 1 month(s) ago and is not associated with an acute injury. The patient describes the pain as aching and dull in intensity,  intermittent in timing, and localizes the pain to the  left shoulder posterior and lateral.  The pain is worse with  golf and overuse  and relieved by rest.  The pain is not associated with numbness and tingling.  The pain is not associated with constitutional symptoms. The patient is awoken at night by the pain.        The following portions of the patient's history were reviewed and updated as appropriate: allergies, current medications, past family history, past medical history, past social history, past surgical history and problem list.        Objective:  /73   Pulse 60   Ht 5' 9\" (1.753 m)   Wt 78.9 kg (174 lb)   BMI 25.70 kg/m²       Left Shoulder Exam     Range of Motion   External rotation:  70   Forward flexion:  140 (full PROM)   Internal rotation 0 degrees:  Lumbar     Muscle " Strength   Abduction: 4/5   Internal rotation: 5/5   External rotation: 4/5     Tests   Barger test: positive  Impingement: positive    Other   Erythema: absent  Sensation: normal  Pulse: present             Physical Exam  Vitals reviewed.   Constitutional:       Appearance: He is well-developed.   HENT:      Head: Normocephalic.   Eyes:      Pupils: Pupils are equal, round, and reactive to light.   Pulmonary:      Effort: Pulmonary effort is normal.   Abdominal:      General: Abdomen is flat. There is no distension.   Skin:     General: Skin is warm and dry.       Large joint arthrocentesis: L subacromial bursa  [place] Protocol:  procedure performed by consultantConsent: Verbal consent obtained.  Consent given by: patient  Patient understanding: patient states understanding of the procedure being performed  Site marked: the operative site was marked  Patient identity confirmed: verbally with patient  Supporting Documentation  Indications: pain   Procedure Details  Location: shoulder - L subacromial bursa  Needle size: 22 G  Ultrasound guidance: no  Approach: lateral  Medications administered: 2 mL bupivacaine 0.25 %; 6 mg betamethasone acetate-betamethasone sodium phosphate 6 (3-3) mg/mL    Patient tolerance: patient tolerated the procedure well with no immediate complications  Dressing:  Sterile dressing applied           I have personally reviewed pertinent films in PACS and my interpretation is as follows.    Left shoulder x-rays demonstrates no fracture or dislocation, positive vacuum sign      Scribe Attestation      I,:  Yeny Bullock MA am acting as a scribe while in the presence of the attending physician.:       I,:  Benja Velasquez MD personally performed the services described in this documentation    as scribed in my presence.:

## 2024-12-09 ENCOUNTER — EVALUATION (OUTPATIENT)
Dept: PHYSICAL THERAPY | Facility: REHABILITATION | Age: 64
End: 2024-12-09
Payer: COMMERCIAL

## 2024-12-09 DIAGNOSIS — M75.82 ROTATOR CUFF TENDONITIS, LEFT: Primary | ICD-10-CM

## 2024-12-09 DIAGNOSIS — M75.52 SUBACROMIAL BURSITIS OF LEFT SHOULDER JOINT: ICD-10-CM

## 2024-12-09 PROCEDURE — 97162 PT EVAL MOD COMPLEX 30 MIN: CPT

## 2024-12-09 PROCEDURE — 97110 THERAPEUTIC EXERCISES: CPT

## 2024-12-09 NOTE — PROGRESS NOTES
PT Evaluation     Today's date: 2024  Patient name: Mic Richter  : 1960  MRN: [id number]  Referring provider: Benja Velasquez*  Dx:   Encounter Diagnosis     ICD-10-CM    1. Rotator cuff tendonitis, left  M75.82 Ambulatory Referral to Physical Therapy      2. Subacromial bursitis of left shoulder joint  M75.52 Ambulatory Referral to Physical Therapy          Start Time: 1620  Stop Time: 1700  Total time in clinic (min): 40 minutes    Assessment  Impairments: abnormal muscle tone, abnormal or restricted ROM, activity intolerance, impaired physical strength, lacks appropriate home exercise program and pain with function  Symptom irritability: moderate    Assessment details:   Mic Richter is a pleasant 64 y.o. male who presents with acute left shoulder pain. Symptoms consistent with referring diagnosis. No weakness with RTC testing. No capsular pattern with PROM/AROM. No numbness/tingling. Good response to recent CSI from Dr. Velasquez. Provided and reviewed HEP today. The impairments listed above are resulting in reduced QoL, difficulties with ADLs/IADLs, and limitations with light and heavy household activities. I expect he will make good progress over the next 4-6 weeks.      Understanding of Dx/Px/POC: good     Prognosis: good    Goals  Short Term Goals (Week 4):  1. Decreased pain by 50%  2. Demonstrate full AROM without pain  3. Improve strength by 1/2 measure  4. GROC >50%      Long Term Goals (8 weeks):  1. GROC >75%  2. Patient will exceed FOTO predicted outcome score  3. Patient will be fully independent with HEP by discharge  4. Patient will be able to manage symptoms independently.       Plan  Patient would benefit from: skilled physical therapy    Planned therapy interventions: graded exercise, functional ROM exercises, flexibility, gait training, graded activity, home exercise program, therapeutic training, therapeutic exercise, therapeutic activities, stretching,  strengthening, patient education, neuromuscular re-education, nerve gliding, behavior modification, balance, activity modification, manual therapy, IASTM and joint mobilization    Frequency: 1-2x week  Duration in weeks: 6  Treatment plan discussed with: patient        Subjective Evaluation    History of Present Illness  Mechanism of injury: Patient presents to PT with acute left shoulder pain. Patient reports symptoms started around 4-6 weeks ago. Patient reports this may have resulted from golf but not from a specific trauma. Patient reports symptoms are localized to anterior shoulder with occasional radiation into the forearm. Patient reports symptoms are worsened with OHAs and ADLs/IADLs. Patient reports symptoms improved with rest, NSAIDs, and recent CSI. Patient denies ayn weakness, stiffness. Patient reports some mild n/t initially but this resolved with the CSI. Patient is RHD.   Patient Goals  Patient goals for therapy: decreased pain, increased motion, return to sport/leisure activities, independence with ADLs/IADLs and increased strength    Pain  Current pain ratin  At worst pain ratin  Quality: sharp and dull ache    Hand dominance: right      Diagnostic Tests  X-ray: normal  Treatments  Current treatment: injection treatment        Objective     Postural Observations  Seated posture: fair  Standing posture: fair      Palpation     Right   Tenderness of the anterior deltoid and biceps.     Tenderness     Right Shoulder  Tenderness in the biceps tendon (proximal) and bicipital groove.     Active Range of Motion     Right Shoulder   Normal active range of motion    Additional Active Range of Motion Details  Slight ERP with flexion, ER (BTH), and IR (BTB)    Passive Range of Motion   Left Shoulder   Flexion: 145 degrees   Abduction: 120 degrees   External rotation 90°: 90 degrees   Internal rotation 90°: 35 degrees     Right Shoulder   Flexion: 135 degrees with pain  Abduction: 120 degrees with  pain  External rotation 90°: 85 degrees with pain  Internal rotation 90°: 35 degrees     Strength/Myotome Testing     Left Shoulder     Planes of Motion   Flexion: 4+   External rotation at 0°: 4+   Internal rotation at 90°: 4+     Isolated Muscles   Biceps: 5   Triceps: 5     Right Shoulder     Planes of Motion   Flexion: 4+   External rotation at 0°: 4+   Internal rotation at 90°: 4+     Isolated Muscles   Biceps: 5   Triceps: 5     Tests     Right Shoulder   Positive empty can, Hawkin's, Neer's and painful arc.   Negative belly press, drop arm and external rotation lag sign.              Precautions:       Manuals 12/9                                                                Neuro Re-Ed                                                                                                        Ther Ex             Pt Edu, HEP, POC PRR            TB Rows HEP            TB Ext HEP            No Money Lifts HEP                                                                             Ther Activity                                       Gait Training                                       Modalities

## 2024-12-13 ENCOUNTER — OFFICE VISIT (OUTPATIENT)
Dept: PHYSICAL THERAPY | Facility: REHABILITATION | Age: 64
End: 2024-12-13
Payer: COMMERCIAL

## 2024-12-13 DIAGNOSIS — M75.52 SUBACROMIAL BURSITIS OF LEFT SHOULDER JOINT: Primary | ICD-10-CM

## 2024-12-13 DIAGNOSIS — M75.82 ROTATOR CUFF TENDONITIS, LEFT: ICD-10-CM

## 2024-12-13 PROCEDURE — 97110 THERAPEUTIC EXERCISES: CPT

## 2024-12-13 PROCEDURE — 97140 MANUAL THERAPY 1/> REGIONS: CPT

## 2024-12-13 NOTE — PROGRESS NOTES
Daily Note     Today's date: 2024  Patient name: Mic Richter  : 1960  MRN: [id number]  Referring provider: Benja Velasquez*  Dx:   Encounter Diagnosis     ICD-10-CM    1. Subacromial bursitis of left shoulder joint  M75.52       2. Rotator cuff tendonitis, left  M75.82           Start Time: 0830  Stop Time: 09  Total time in clinic (min): 32 minutes    Subjective: patient reports his shoulder is doing pretty good today. States HEP went well.       Objective: See treatment diary below      Assessment: Tolerated treatment well today. Improved PROM with STM to subscap. Tolerated RTC exercises with mild symptoms. Updated HEP see below. Discussed soreness following new exercises. Patient would benefit from continued PT      Plan: Continue per plan of care.      Precautions:       Manuals            STM Subscap  PRR                                                   Neuro Re-Ed                                                                                                        Ther Ex             Pt Edu, HEP, POC PRR            TB Rows HEP            TB Ext HEP            No Money Lifts HEP            No Money  2x10 ptb           Flexion w/ ER Iso  2x10 ptb           R Sidelying ER  3x10 2#           90/90 Carry  3# kb 2 laps 50 ft x2                        Ther Activity                                       Gait Training                                       Modalities

## 2024-12-16 ENCOUNTER — OFFICE VISIT (OUTPATIENT)
Dept: PHYSICAL THERAPY | Facility: REHABILITATION | Age: 64
End: 2024-12-16
Payer: COMMERCIAL

## 2024-12-16 DIAGNOSIS — M75.52 SUBACROMIAL BURSITIS OF LEFT SHOULDER JOINT: ICD-10-CM

## 2024-12-16 DIAGNOSIS — M75.82 ROTATOR CUFF TENDONITIS, LEFT: Primary | ICD-10-CM

## 2024-12-16 PROCEDURE — 97140 MANUAL THERAPY 1/> REGIONS: CPT

## 2024-12-16 PROCEDURE — 97110 THERAPEUTIC EXERCISES: CPT

## 2024-12-16 NOTE — PROGRESS NOTES
Daily Note     Today's date: 2024  Patient name: Mic Richter  : 1960  MRN: [id number]  Referring provider: Benja Velasquez*  Dx:   Encounter Diagnosis     ICD-10-CM    1. Rotator cuff tendonitis, left  M75.82       2. Subacromial bursitis of left shoulder joint  M75.52           Start Time: 0800  Stop Time: 0830  Total time in clinic (min): 30 minutes    Subjective: Patient reports his shoulder has been improving. States shoulder seems to hurt most after sleeping.      Objective: See treatment diary below      Assessment: Tolerated treatment well today. Progressed activities without issue. Muscular fatigue in RTC throughout session. Progress as tolerated. Patient would benefit from continued PT      Plan: Continue per plan of care.      Precautions:       Manuals           STM Subscap  PRR  PRR                                                 Neuro Re-Ed             Supine SA Press into Flex   2x10 3# kb          R Sidelying Abd   2x10 3# kb          Wall Slides   2x10                                                              Ther Ex             Pt Edu, HEP, POC PRR            TB Rows HEP            TB Ext HEP            No Money Lifts HEP            No Money  2x10 ptb 2x10 ptb          Flexion w/ ER Iso  2x10 ptb 2x10 ptb          R Sidelying ER  3x10 2# 3x10 3#          90/90 Carry  3# kb 2 laps 50 ft x2 3# kb 2 laps 50 ft x2                       Ther Activity                                       Gait Training                                       Modalities

## 2024-12-19 ENCOUNTER — APPOINTMENT (OUTPATIENT)
Dept: PHYSICAL THERAPY | Facility: REHABILITATION | Age: 64
End: 2024-12-19
Payer: COMMERCIAL

## 2024-12-23 ENCOUNTER — OFFICE VISIT (OUTPATIENT)
Dept: PHYSICAL THERAPY | Facility: REHABILITATION | Age: 64
End: 2024-12-23
Payer: COMMERCIAL

## 2024-12-23 DIAGNOSIS — M75.82 ROTATOR CUFF TENDONITIS, LEFT: Primary | ICD-10-CM

## 2024-12-23 DIAGNOSIS — M75.52 SUBACROMIAL BURSITIS OF LEFT SHOULDER JOINT: ICD-10-CM

## 2024-12-23 PROCEDURE — 97112 NEUROMUSCULAR REEDUCATION: CPT

## 2024-12-23 PROCEDURE — 97140 MANUAL THERAPY 1/> REGIONS: CPT

## 2024-12-23 PROCEDURE — 97110 THERAPEUTIC EXERCISES: CPT

## 2024-12-23 NOTE — PROGRESS NOTES
Daily Note     Today's date: 2024  Patient name: Mic Richter  : 1960  MRN: [id number]  Referring provider: Benja Velasquez*  Dx:   Encounter Diagnosis     ICD-10-CM    1. Rotator cuff tendonitis, left  M75.82       2. Subacromial bursitis of left shoulder joint  M75.52           Start Time: 1430  Stop Time: 1500  Total time in clinic (min): 30 minutes    Subjective: Patient reports improvements since last visit. States he still gets some jolts of pain when reaching out to the side.      Objective: See treatment diary below      Assessment: Tolerated treatment well today. Progressed stabilization exercises without issue. Muscular fatigue throughout session. Progress as tolerated. Patient would benefit from continued PT      Plan: Continue per plan of care.      Precautions:       Manuals          STM Subscap  PRR  PRR PRR                                                Neuro Re-Ed             Supine SA Press into Flex   2x10 3# kb          R Sidelying Abd   2x10 3# kb          Wall Slides   2x10          Supine SA Press Left rotation    3x10 3# kb                                                Ther Ex             Pt Edu, HEP, POC PRR            PROM    PRR         TB Rows HEP            TB Ext HEP            No Money Lifts HEP            No Money  2x10 ptb 2x10 ptb          Flexion w/ ER Iso  2x10 ptb 2x10 ptb          R Sidelying ER  3x10 2# 3x10 3# 3x8 4#          90/90 Carry  3# kb 2 laps 50 ft x2 4# kb 4 laps 50 ft x2 4# kb 4 laps 50 ft x2         90/90 1/2 Kneel Holds    3x10 3# kb         Ther Activity                                       Gait Training                                       Modalities

## 2024-12-26 ENCOUNTER — OFFICE VISIT (OUTPATIENT)
Dept: PHYSICAL THERAPY | Facility: REHABILITATION | Age: 64
End: 2024-12-26
Payer: COMMERCIAL

## 2024-12-26 DIAGNOSIS — M75.52 SUBACROMIAL BURSITIS OF LEFT SHOULDER JOINT: ICD-10-CM

## 2024-12-26 DIAGNOSIS — M75.82 ROTATOR CUFF TENDONITIS, LEFT: Primary | ICD-10-CM

## 2024-12-26 PROCEDURE — 97110 THERAPEUTIC EXERCISES: CPT

## 2024-12-26 PROCEDURE — 97140 MANUAL THERAPY 1/> REGIONS: CPT

## 2024-12-26 PROCEDURE — 97112 NEUROMUSCULAR REEDUCATION: CPT

## 2024-12-26 NOTE — PROGRESS NOTES
Daily Note     Today's date: 2024  Patient name: Mic Richter  : 1960  MRN: [id number]  Referring provider: Benja Velasquez*  Dx:   Encounter Diagnosis     ICD-10-CM    1. Rotator cuff tendonitis, left  M75.82       2. Subacromial bursitis of left shoulder joint  M75.52           Start Time: 1615  Stop Time: 1650  Total time in clinic (min): 35 minutes    Subjective: Patient reports he was moderately sore for 1 day following last visit. States this resolved by the next day.       Objective: See treatment diary below      Assessment: Tolerated treatment well today. Less fatigue with activities today. Deferred progressions today to ensure soreness was more manageable with new activiites. Patient would benefit from continued PT      Plan: Continue per plan of care.      Precautions:       Manuals         STM Subscap  PRR  PRR PRR PRR                                               Neuro Re-Ed             Supine SA Press into Flex   2x10 3# kb          R Sidelying Abd   2x10 3# kb          Wall Slides   2x10          Supine SA Press Left rotation    3x10 3# kb 3x10 3# kb                                               Ther Ex             Pt Edu, HEP, POC PRR            PROM    PRR PRR        TB Rows HEP            TB Ext HEP            No Money Lifts HEP            No Money  2x10 ptb 2x10 ptb          Flexion w/ ER Iso  2x10 ptb 2x10 ptb          R Sidelying ER  3x10 2# 3x10 3# 3x8 4#  3x8 4#         90/90 Carry  3# kb 2 laps 50 ft x2 4# kb 4 laps 50 ft x2 4# kb 4 laps 50 ft x2 4# kb 4 laps 50 ft x2        90/90 1/2 Kneel Holds    3x10 3# kb 3x10 3# kb        Ther Activity                                       Gait Training                                       Modalities

## 2024-12-31 ENCOUNTER — APPOINTMENT (OUTPATIENT)
Dept: PHYSICAL THERAPY | Facility: REHABILITATION | Age: 64
End: 2024-12-31
Payer: COMMERCIAL

## 2025-01-07 ENCOUNTER — OFFICE VISIT (OUTPATIENT)
Dept: PHYSICAL THERAPY | Facility: REHABILITATION | Age: 65
End: 2025-01-07
Payer: COMMERCIAL

## 2025-01-07 DIAGNOSIS — M75.52 SUBACROMIAL BURSITIS OF LEFT SHOULDER JOINT: ICD-10-CM

## 2025-01-07 DIAGNOSIS — M75.82 ROTATOR CUFF TENDONITIS, LEFT: Primary | ICD-10-CM

## 2025-01-07 PROCEDURE — 97112 NEUROMUSCULAR REEDUCATION: CPT

## 2025-01-07 PROCEDURE — 97140 MANUAL THERAPY 1/> REGIONS: CPT

## 2025-01-07 PROCEDURE — 97110 THERAPEUTIC EXERCISES: CPT

## 2025-01-07 NOTE — PROGRESS NOTES
Daily Note     Today's date: 2025  Patient name: Mic Richter  : 1960  MRN: [id number]  Referring provider: Benja Velasquez*  Dx:   Encounter Diagnosis     ICD-10-CM    1. Rotator cuff tendonitis, left  M75.82       2. Subacromial bursitis of left shoulder joint  M75.52           Start Time: 0800  Stop Time: 0832  Total time in clinic (min): 32 minutes    Subjective: Patient reports his shoulder has been holding up well. States he is still getting a few instances of sharper pain with reaching, but symptoms quickly resolve.       Objective: See treatment diary below      Assessment: Tolerated treatment well today. PROM/AROM WNL. Weakness with strength testing of LT and MT with reproduction of discomfort. Updated HEP see below. Patient would benefit from continued PT      Plan: Continue per plan of care.      Precautions:       Manuals        STM Subscap  PRR  PRR PRR PRR PRR                                 Re-Assessment      PRR       Neuro Re-Ed             Supine SA Press into Flex   2x10 3# kb          R Sidelying Abd   2x10 3# kb          Wall Slides   2x10          Supine SA Press Left rotation    3x10 3# kb 3x10 3# kb        Prone T      1# 2x10 HEP       Prone Y      1# 2x5 HEP                    Ther Ex             Pt Edu, HEP, POC PRR            PROM    PRR PRR        TB Rows HEP            TB Ext HEP            No Money Lifts HEP            No Money  2x10 ptb 2x10 ptb          Flexion w/ ER Iso  2x10 ptb 2x10 ptb          R Sidelying ER  3x10 2# 3x10 3# 3x8 4#  3x8 4#         90/90 Carry  3# kb 2 laps 50 ft x2 4# kb 4 laps 50 ft x2 4# kb 4 laps 50 ft x2 4# kb 4 laps 50 ft x2        90/90 1/2 Kneel Holds    3x10 3# kb 3x10 3# kb        Ther Activity                                       Gait Training                                       Modalities

## 2025-01-21 ENCOUNTER — OFFICE VISIT (OUTPATIENT)
Dept: PHYSICAL THERAPY | Facility: REHABILITATION | Age: 65
End: 2025-01-21
Payer: COMMERCIAL

## 2025-01-21 DIAGNOSIS — M75.52 SUBACROMIAL BURSITIS OF LEFT SHOULDER JOINT: ICD-10-CM

## 2025-01-21 DIAGNOSIS — M75.82 ROTATOR CUFF TENDONITIS, LEFT: Primary | ICD-10-CM

## 2025-01-21 PROCEDURE — 97112 NEUROMUSCULAR REEDUCATION: CPT

## 2025-01-21 PROCEDURE — 97140 MANUAL THERAPY 1/> REGIONS: CPT

## 2025-01-21 NOTE — PROGRESS NOTES
Progress / Daily Note     Today's date: 2025  Patient name: Mic Richter  : 1960  MRN: [id number]  Referring provider: Benja Velasquez*  Dx:   Encounter Diagnosis     ICD-10-CM    1. Rotator cuff tendonitis, left  M75.82       2. Subacromial bursitis of left shoulder joint  M75.52           Start Time: 0800  Stop Time: 0830  Total time in clinic (min): 30 minutes      Subjective: Patient reports his shoulder was holding up well until he had some pain lifting his suitcase when he was away.      Objective: See treatment diary below      Assessment:   Patient demonstrates full AROM/PROM without pain. RTC testing unremarkable for weakness. Slight discomfort with O'Briens and mayo. Updated HEP to address remaining impairments in the shoulder. Patient would benefit from continued PT      Plan: Continue per plan of care.      Precautions:       Manuals       STM Subscap  PRR  PRR PRR PRR PRR                                 Re-Assessment      PRR PRR      Neuro Re-Ed             Supine SA Press into Flex   2x10 3# kb          R Sidelying Abd   2x10 3# kb          Wall Slides   2x10          Supine SA Press Left rotation    3x10 3# kb 3x10 3# kb        Prone T      1# 2x10 HEP       Prone Y      1# 2x5 HEP       Front Raises       2# 2x10 HEP      Ther Ex             Pt Edu, HEP, POC PRR            PROM    PRR PRR  PROM      TB Rows HEP            TB Ext HEP            No Money Lifts HEP            No Money  2x10 ptb 2x10 ptb          Flexion w/ ER Iso  2x10 ptb 2x10 ptb          R Sidelying ER  3x10 2# 3x10 3# 3x8 4#  3x8 4#         90/90 Carry  3# kb 2 laps 50 ft x2 4# kb 4 laps 50 ft x2 4# kb 4 laps 50 ft x2 4# kb 4 laps 50 ft x2        90/90 1/2 Kneel Holds    3x10 3# kb 3x10 3# kb        Ther Activity                                       Gait Training                                       Modalities

## 2025-01-23 ENCOUNTER — OFFICE VISIT (OUTPATIENT)
Dept: OBGYN CLINIC | Facility: OTHER | Age: 65
End: 2025-01-23
Payer: COMMERCIAL

## 2025-01-23 VITALS — HEIGHT: 69 IN | BODY MASS INDEX: 25.92 KG/M2 | WEIGHT: 175 LBS

## 2025-01-23 DIAGNOSIS — M24.812 INTERNAL DERANGEMENT OF LEFT SHOULDER: Primary | ICD-10-CM

## 2025-01-23 DIAGNOSIS — M75.52 SUBACROMIAL BURSITIS OF LEFT SHOULDER JOINT: ICD-10-CM

## 2025-01-23 DIAGNOSIS — M75.82 ROTATOR CUFF TENDONITIS, LEFT: ICD-10-CM

## 2025-01-23 PROCEDURE — 99214 OFFICE O/P EST MOD 30 MIN: CPT | Performed by: ORTHOPAEDIC SURGERY

## 2025-02-05 ENCOUNTER — HOSPITAL ENCOUNTER (OUTPATIENT)
Dept: RADIOLOGY | Facility: HOSPITAL | Age: 65
Discharge: HOME | End: 2025-02-05
Attending: ORTHOPAEDIC SURGERY
Payer: COMMERCIAL

## 2025-02-05 DIAGNOSIS — M24.812 INTERNAL DERANGEMENT OF LEFT SHOULDER: ICD-10-CM

## 2025-02-05 PROCEDURE — 73221 MRI JOINT UPR EXTREM W/O DYE: CPT

## 2025-02-11 ENCOUNTER — TELEPHONE (OUTPATIENT)
Dept: OTHER | Facility: HOSPITAL | Age: 65
End: 2025-02-11

## 2025-02-11 NOTE — QUICK NOTE
Spoke with the patient over the phone and reviewed the MRI results which show no evidence of a rotator cuff tear or other intra-articular pathology.  I do feel that his MRI and his examination are consistent with impingement syndrome and he will continue to rehabilitate with his home exercise program and follow-up in the future if he requires a repeat injection and/or wishes to consider other options.  I think it is unlikely that he will require any more aggressive treatment given the findings of the MRI.  He does have an appointment to see us on 24 February for follow-up he may consider canceling that appointment if he continues to improve.

## 2025-03-03 DIAGNOSIS — E78.2 MIXED HYPERLIPIDEMIA: Primary | ICD-10-CM

## 2025-03-03 NOTE — TELEPHONE ENCOUNTER
Reason for call:   [x] Refill   [] Prior Auth  [] Other:     Office:   [] PCP/Provider -   [x] Specialty/Provider - Dr.Christopher Bender - cardio    Medication:     rosuvastatin (CRESTOR) 40 MG tablet         [id number] [address] [place] [address] [phone]     Does the patient have enough for 3 days?   [] Yes   [x] No - Send as HP to POD

## 2025-03-04 RX ORDER — ROSUVASTATIN CALCIUM 40 MG/1
40 TABLET, COATED ORAL DAILY
Qty: 90 TABLET | Refills: 3 | Status: SHIPPED | OUTPATIENT
Start: 2025-03-04

## 2025-05-05 ENCOUNTER — TELEPHONE (OUTPATIENT)
Dept: OBGYN CLINIC | Facility: OTHER | Age: 65
End: 2025-05-05

## 2025-05-05 DIAGNOSIS — M54.12 CERVICAL RADICULOPATHY: Primary | ICD-10-CM

## 2025-05-05 RX ORDER — METHYLPREDNISOLONE 4 MG/1
TABLET ORAL
Qty: 1 EACH | Refills: 2 | Status: SHIPPED | OUTPATIENT
Start: 2025-05-05

## 2025-05-05 NOTE — TELEPHONE ENCOUNTER
The patient did contact my office stating that he has developed somewhat different symptoms consisting of pain in the neck radiating down the lateral aspect of the left arm and into the fingertips with numbness and tingling in the thumb and index finger.  He had been discharged to home from physical therapy for his subacromial impingement but seems like that may have resolved and this is more consistent with cervical radiculopathy which is causing him some significant symptoms.  I have provided him with a referral back to physical therapy for cervical radiculopathy as well as a referral to the spine pain center for further evaluation and treatment.  Given the acute nature of the symptoms a Medrol Dosepak was also prescribed.

## 2025-05-09 ENCOUNTER — EVALUATION (OUTPATIENT)
Dept: PHYSICAL THERAPY | Facility: REHABILITATION | Age: 65
End: 2025-05-09
Attending: ORTHOPAEDIC SURGERY
Payer: COMMERCIAL

## 2025-05-09 DIAGNOSIS — M54.12 CERVICAL RADICULOPATHY: Primary | ICD-10-CM

## 2025-05-09 PROCEDURE — 97110 THERAPEUTIC EXERCISES: CPT

## 2025-05-09 PROCEDURE — 97162 PT EVAL MOD COMPLEX 30 MIN: CPT

## 2025-05-09 NOTE — PROGRESS NOTES
PT Evaluation     Today's date: 2025  Patient name: Mic Richter  : 1960  MRN: [id number]  Referring provider: Benja Velasquez*  Dx:   Encounter Diagnosis     ICD-10-CM    1. Cervical radiculopathy  M54.12 Ambulatory referral to Physical Therapy          Start Time: 1145  Stop Time: 1220  Total time in clinic (min): 35 minutes    Assessment  Impairments: abnormal muscle tone, abnormal or restricted ROM, activity intolerance, impaired physical strength, lacks appropriate home exercise program and pain with function  Symptom irritability: moderate    Assessment details:   Mic Richter is a pleasant 64 y.o. male who presents with acute left sided neck pain with radiating symptoms. Symptoms consistent with cervical radiculopathy following a C5-6 distribution. Patient is neurologically intact as of today. Positive spurlings and ULTT today. Provided and reviewed HEP today. Patient is scheduled with Dr. Vaz 25. The impairments listed above are resulting in reduced QoL, difficulties sleeping, and limitations with OHAs.No further referral appears necessary at this time based upon examination results. I expect he will see improvements in symptoms over the next 4-6 weeks.          Understanding of Dx/Px/POC: good     Prognosis: good    Goals  Short Term Goals (Week 4):  1. Decreased pain by 50%  2. Demonstrate normalized ULTT in all positions  3. GROC >50%      Long Term Goals (8 weeks):  1. Patient will exceed FOTO predicted outcome score  2. Patient will be fully independent with HEP by discharge  3. Patient will be able to manage symptoms independently.       Plan  Patient would benefit from: skilled physical therapy    Planned therapy interventions: graded exercise, functional ROM exercises, flexibility, graded activity, home exercise program, therapeutic training, therapeutic exercise, therapeutic activities, stretching, strengthening, patient education, neuromuscular re-education, nerve  gliding, behavior modification, activity modification, manual therapy, IASTM and joint mobilization    Frequency: 1-2x week  Duration in weeks: 6  Treatment plan discussed with: patient      Subjective  Patient presents to PT with acute left sided neck pain with radiating symptoms into the hand. Patient was seen back in January for left shoulder pain which was improving but worsened around 1 month ago without cause. The pain in the left side of the neck and down the arm started around then. Patient reports he was placed on 6 day steroid taper by Dr. Velasquez who referred him back to PT. Patient states improvements in frequency and intensity of the symptoms with the steroid, reports 1 day left before finishing the taper. Patient reports symptoms are aggravated with OHAs, reaching out the side, and sleeping on the left shoulder. Patient denies any reproduction of symptoms with movements of the neck. Patient denies any associated weakness and/or numbness/tingling in the arm since this started up. Patient reports he is scheduled to see Dr. Aponte 5/23/25 for evaluation of the neck. Patient denies any 5 D's, 3 N's, and/or gait disturbances. Patient denies any associated headaches.     Objective  Postural Observation   Sitting: mild kyphosis  Protruded Head: mild  Lateral Deviation: n/a  Change of Posture: no effect  Lateral Deviation Relevant: nil    Myotomes  Strength WNL bilaterally    Dermatomes  Sensation Intact Bilaterally    Reflexes  R Biceps: 2+  L Biceps: 2+  R Brachioradialis: 2+  L Brachioradialis: 2+  R Triceps: 2+  L Triceps: 2+    Neurodynamic Testing  Median Nerve: positive  Ulnar Nerve: positive  Radial Nerve: positive    Cervical Active Range of Motion  Movement Loss Deuce Mod Min Nil Symptoms   Protrusion   x     Flexion   x     Retraction  x      Extension  x      R Lat Flex   x     L Lat Flex  x      R Rotation   x     L Rotation  x        Tiara Assessment  Sitting:  Rep RET: no effect, nb/nw  Rep RET  EXT: produce neck symptoms, improved ULTT following    Special Tests  Sharp Galen= (-), Alar Ligament= (-), Flexion-Rotation Test= (-), Compression= (-), Spurlings= (+), Distraction= (-), VBI test (-)  Flowsheet Rows      Flowsheet Row Most Recent Value   PT/OT G-Codes    Current Score 56   Projected Score 67               Precautions: none      Manuals                                                                 Neuro Re-Ed                                                                                                        Ther Ex             Rep Ret + Ext HEP            Radial Nerve Glider HEP light                                                                                          Ther Activity                                       Gait Training                                       Modalities

## 2025-05-15 ENCOUNTER — OFFICE VISIT (OUTPATIENT)
Dept: PHYSICAL THERAPY | Facility: REHABILITATION | Age: 65
End: 2025-05-15
Attending: ORTHOPAEDIC SURGERY
Payer: COMMERCIAL

## 2025-05-15 DIAGNOSIS — M54.12 CERVICAL RADICULOPATHY: Primary | ICD-10-CM

## 2025-05-15 PROCEDURE — 97110 THERAPEUTIC EXERCISES: CPT

## 2025-05-15 PROCEDURE — 97140 MANUAL THERAPY 1/> REGIONS: CPT

## 2025-05-15 NOTE — PROGRESS NOTES
Daily Note     Today's date: 5/15/2025  Patient name: Mic Richter  : 1960  MRN: [id number]  Referring provider: Benja Velasquez*  Dx:   Encounter Diagnosis     ICD-10-CM    1. Cervical radiculopathy  M54.12           Start Time: 0845  Stop Time: 0915  Total time in clinic (min): 30 minutes    Subjective: Patient reports his symptoms have been doing alright since last visit. States HEP is going fine.       Objective: See treatment diary below      Assessment: Tolerated treatment well today. Improvements in radial and median neural tension compared to last session, this was further improved end of session. Updated HEP see below. Will see patient back next week. Patient would benefit from continued PT      Plan: Continue per plan of care.      Precautions: none      Manuals 5/9 5/15           Central Gliders  C5-6 PRR 3x15           Post Cuff STM  Light PRR                                     Neuro Re-Ed                                                                                                        Ther Ex             Rep Ret + Ext HEP            Radial Nerve Glider HEP light Radian nerve tensioner + cervical SB HEP                                                                                         Ther Activity                                       Gait Training                                       Modalities

## 2025-05-22 ENCOUNTER — OFFICE VISIT (OUTPATIENT)
Dept: PHYSICAL THERAPY | Facility: REHABILITATION | Age: 65
End: 2025-05-22
Attending: ORTHOPAEDIC SURGERY
Payer: COMMERCIAL

## 2025-05-22 DIAGNOSIS — M54.12 CERVICAL RADICULOPATHY: Primary | ICD-10-CM

## 2025-05-22 PROCEDURE — 97140 MANUAL THERAPY 1/> REGIONS: CPT

## 2025-05-22 PROCEDURE — 97110 THERAPEUTIC EXERCISES: CPT

## 2025-05-22 NOTE — PROGRESS NOTES
Daily Note     Today's date: 2025  Patient name: Mic Richter  : 1960  MRN: [id number]  Referring provider: Benja Velasquez*  Dx:   Encounter Diagnosis     ICD-10-CM    1. Cervical radiculopathy  M54.12           Start Time:   Stop Time: 0  Total time in clinic (min): 32 minutes    Subjective: Patient reports increased symptoms following a flight yesterday. States his neck and shoulder tightened up following and last into today.       Objective: See treatment diary below      Assessment: Tolerated treatment very well today. Able to normalized cervical ROM and ULTT testing end of session following repeated movements. Patient demonstrates an extension preference with a derangement syndrome in the neck. Patient will being seeing pain management tomorrow, will await their input. Patient would benefit from continued PT      Plan: Continue per plan of care.      Precautions: none      Manuals 5/9 5/15 5/22          Central Gliders  C5-6 PRR 3x15 C5-6 PRR 3x10          Post Cuff STM  Light PRR                        Re-Assessment   PRR          Neuro Re-Ed                                                                                                        Ther Ex             Rep Ret + Ext HEP            Radial Nerve Glider HEP light Radian nerve tensioner + cervical SB HEP           Supine Ret + Ext w/ PT OP   2x5 HEP          Seated Ret + Ext w/ pt OP   2x5 HEP                                                              Ther Activity                                       Gait Training                                       Modalities

## 2025-05-23 ENCOUNTER — APPOINTMENT (OUTPATIENT)
Dept: RADIOLOGY | Facility: CLINIC | Age: 65
End: 2025-05-23
Attending: ANESTHESIOLOGY
Payer: COMMERCIAL

## 2025-05-23 ENCOUNTER — TELEPHONE (OUTPATIENT)
Dept: PAIN MEDICINE | Facility: CLINIC | Age: 65
End: 2025-05-23

## 2025-05-23 ENCOUNTER — CONSULT (OUTPATIENT)
Dept: PAIN MEDICINE | Facility: CLINIC | Age: 65
End: 2025-05-23

## 2025-05-23 ENCOUNTER — APPOINTMENT (OUTPATIENT)
Dept: RADIOLOGY | Facility: CLINIC | Age: 65
End: 2025-05-23
Payer: COMMERCIAL

## 2025-05-23 VITALS — HEART RATE: 57 BPM | BODY MASS INDEX: 26.07 KG/M2 | TEMPERATURE: 98.3 F | WEIGHT: 176 LBS | HEIGHT: 69 IN

## 2025-05-23 DIAGNOSIS — M54.2 NECK PAIN: ICD-10-CM

## 2025-05-23 DIAGNOSIS — M54.12 LEFT CERVICAL RADICULOPATHY: ICD-10-CM

## 2025-05-23 DIAGNOSIS — M54.12 LEFT CERVICAL RADICULOPATHY: Primary | ICD-10-CM

## 2025-05-23 PROCEDURE — 72050 X-RAY EXAM NECK SPINE 4/5VWS: CPT

## 2025-05-23 NOTE — TELEPHONE ENCOUNTER
----- Message from Alexis Vaz DO sent at 5/23/2025 10:16 AM EDT -----  Patient x-ray does reveal some narrowing on the left at C6-7 however all no acute findings and will await MRI for further elucidation

## 2025-05-23 NOTE — TELEPHONE ENCOUNTER
Caller: Mic     Doctor: Dr. Vaz    Reason for call: Patient returning call from nurse please advise     Call back#: [phone]

## 2025-05-23 NOTE — TELEPHONE ENCOUNTER
S/w pt and advised of Xray results. Pt will have MRI done and is aware that he will be contacted with results and next steps. Pt verbalized understanding and appreciative of call.

## 2025-05-23 NOTE — PROGRESS NOTES
Name: Mic Richter      : 1960      MRN: [id number]  Encounter Provider: Alexis Vaz DO  Encounter Date: 2025   Encounter department: [facility]  :  Assessment & Plan  Left cervical radiculopathy  Neck pain      Orders:    MRI cervical spine without contrast; Future    XR spine cervical complete 4 or 5 vw non injury; Future      The patient's pain persists despite time, relative rest, activity modification, and nonsteroidal anti-inflammatories.  He has undergone a course of physical therapy.  His pain is significantly interfering with his daily living activities. It is appropriate to order an MRI of the cervical spine to help evaluate any significant etiology of his symptoms.    He will continue with his home therapy exercises.    Once we obtain MRI results, I will follow-up with the patient, review the results and current symptoms, and discuss the next steps of the treatment plan.    My impressions and treatment recommendations were discussed in detail with the patient who verbalized understanding and had no further questions.  Discharge instructions were provided. I personally saw and examined the patient and I agree with the above discussed plan of care.  This note is created using dictation transcription.  It may contain typographical errors, grammatical errors, improperly dictated words, background noise and other errors.    Referred By: Benja Velasquez MD  History of Present Illness     Mic Richter is a 64 y.o. male with 9-month history of left-sided neck and shoulder pain now rating down his left arm with numbness and tingling into his hand.  His pain does interfere with his daily living duties which is nearly as constant described as shooting.  He denies any weakness.  He reports that standing decreases symptoms while sitting aggravates his pain.  Physical therapy and exercises provided moderate but short-term relief.    Review of Systems   Constitutional:   "Negative for fever and unexpected weight change.   HENT:  Negative for trouble swallowing.    Eyes:  Negative for visual disturbance.   Respiratory:  Negative for shortness of breath and wheezing.    Cardiovascular:  Negative for chest pain and palpitations.   Gastrointestinal:  Negative for constipation, diarrhea, nausea and vomiting.   Endocrine: Negative for cold intolerance, heat intolerance and polydipsia.   Genitourinary:  Negative for difficulty urinating and frequency.   Musculoskeletal:  Positive for arthralgias. Negative for gait problem, joint swelling and myalgias.   Skin:  Negative for rash.   Neurological:  Negative for dizziness, seizures, syncope, weakness and headaches.   Hematological:  Does not bruise/bleed easily.   Psychiatric/Behavioral:  Negative for dysphoric mood.    All other systems reviewed and are negative.    Medical History Reviewed by provider this encounter:  Tobacco  Meds  Problems  Med Hx  Surg Hx  Fam Hx     .  Medical History Reviewed by provider this encounter:  Tobacco  Meds  Problems  Med Hx  Surg Hx  Fam Hx     .     Objective   Pulse 57   Temp 98.3 °F (36.8 °C)   Ht 5' 9\" (1.753 m)   Wt 79.8 kg (176 lb)   BMI 25.99 kg/m²      Pain Score: 0-No pain  Physical Exam  Constitutional:normal, well developed, well nourished, alert, in no distress and non-toxic and no overt pain behavior.  Eyes:anicteric  HEENT:grossly intact  Neck:supple, symmetric, trachea midline and no masses   Pulmonary:even and unlabored  Cardiovascular:No edema or pitting edema present  Skin:Normal without rashes or lesions and well hydrated  Psychiatric:Mood and affect appropriate  Neurologic:Cranial Nerves II-XII grossly intact  Musculoskeletal: normal gait,  Inspection: Normal station and gait. Normal cervical curves and head posture. Skin intact without erythema. No sensory loss. There is no atrophy.   Palpation: There is tenderness along the left greater than right cervical paraspinals no " shoulder tenderness  Motor/Strength: Equal strength in the bilateral upper extremities  Reflexes: equal and symmetric in the upper limbs. Sensation: Sensation decreased in the left C6 distribution of pinwheel  Maneuvers: Positive cervical Spurling maneuver in the left. Negative Lhermitte's sign.    MRI LEFT SHOULDER @ SL 2-5-25     INDICATION:   M24.812: Other specific joint derangements of left shoulder, not elsewhere classified. Orthopedic note from 1/23/2025 was reviewed. Patient has left shoulder pain, impingement syndrome with persistent pain after physical therapy. Rotator   cuff pathology suspected.     COMPARISON: Left shoulder plain films on 12/5/2024.     TECHNIQUE:   Multiplanar/multisequence MR of the left shoulder was performed.        FINDINGS:     SUBCUTANEOUS TISSUES: Normal     JOINT EFFUSION: None.     ACROMION PROCESS: Normal.     ROTATOR CUFF: Intact.     SUBACROMIAL/SUBDELTOID BURSA: Normal.     LONG HEAD OF BICEPS TENDON: Normal.     GLENOID LABRUM: Intact.     GLENOHUMERAL JOINT: Intact.     ACROMIOCLAVICULAR JOINT: There is mild osteoarthritis.     BONES: Normal.     IMPRESSION:     Mild acromioclavicular joint osteoarthritis, otherwise unremarkable left shoulder MR. Intact rotator cuff.    LEFT SHOULDER @ SL 12-5-24     INDICATION:   Pain in left shoulder.      COMPARISON:  None.     VIEWS:  XR SHOULDER 2+ VW LEFT      FINDINGS:     There is no acute fracture or dislocation.     No significant degenerative changes.     No lytic or blastic osseous lesion.     Soft tissues are unremarkable. There is incidental minimal vacuum phenomenon at the glenohumeral joint space, nonspecific.     IMPRESSION:        No acute osseous abnormality.  Minimal vacuum phenomenon within the glenohumeral joint space.    Radiology Results Review: I have reviewed the following images/report studies in PACS: Which reveal no significant degenerative changes of the shoulder

## 2025-05-23 NOTE — PROGRESS NOTES
Assessment  No diagnosis found.    Plan  ***    {Oral Swab Statement:48815}    {Opioid Statement:11636}    {UDS Statement:37290}    {PDMP Statement:52122}    {Pain Management Procedure Statement:26013}    My impressions and treatment recommendations were discussed in detail with the patient who verbalized understanding and had no further questions.  Discharge instructions were provided. I personally saw and examined the patient and I agree with the above discussed plan of care.    No orders of the defined types were placed in this encounter.    No orders of the defined types were placed in this encounter.    Referred By: Benja Velasquez MD  History of Present Illness    Mic Richter is a 64 y.o. male ***    I have personally reviewed and/or updated the patient's past medical history, past surgical history, family history, social history, current medications, allergies, and vital signs today.     Review of Systems   Constitutional:  Negative for fever and unexpected weight change.   HENT:  Negative for trouble swallowing.    Eyes:  Negative for visual disturbance.   Respiratory:  Negative for shortness of breath and wheezing.    Cardiovascular:  Negative for chest pain and palpitations.   Gastrointestinal:  Negative for constipation, diarrhea, nausea and vomiting.   Endocrine: Negative for cold intolerance, heat intolerance and polydipsia.   Genitourinary:  Negative for difficulty urinating and frequency.   Musculoskeletal:  Negative for arthralgias, gait problem, joint swelling and myalgias.   Skin:  Negative for rash.   Neurological:  Negative for dizziness, seizures, syncope, weakness and headaches.   Hematological:  Does not bruise/bleed easily.   Psychiatric/Behavioral:  Negative for dysphoric mood.    All other systems reviewed and are negative.      Problem List[1]    Past Medical History[2]    Past Surgical History[3]    Family History[4]    Social History     Occupational History    Not on file  "  Tobacco Use    Smoking status: Never     Passive exposure: Never    Smokeless tobacco: Never   Vaping Use    Vaping status: Never Used   Substance and Sexual Activity    Alcohol use: Yes     Alcohol/week: 2.0 standard drinks of alcohol     Types: 1 Glasses of wine, 1 Cans of beer per week     Comment: social    Drug use: Never    Sexual activity: Not on file       Current Outpatient Medications on File Prior to Visit   Medication Sig    aspirin (ECOTRIN LOW STRENGTH) 81 mg EC tablet Take 81 mg by mouth daily    Continuous Blood Gluc Sensor (FreeStyle Hernesto 3 Sensor) MISC HERNESTO 3 APPLY TO AFFECTED AREA SQ DAILY 14 DAYS (Patient not taking: Reported on 2025)    ergocalciferol (VITAMIN D2) 50,000 units Take 50,000 Units by mouth once a week    ezetimibe (ZETIA) 10 mg tablet Take 1 tablet (10 mg total) by mouth daily    losartan (COZAAR) 50 mg tablet Take 1 tablet (50 mg total) by mouth daily    methylPREDNISolone 4 MG tablet therapy pack Use as directed on package    rosuvastatin (CRESTOR) 40 MG tablet Take 1 tablet (40 mg total) by mouth daily    Testosterone 10 MG/ACT (2%) GEL  (Patient not taking: Reported on 2025)     No current facility-administered medications on file prior to visit.       Allergies[5]    Physical Exam    There were no vitals taken for this visit.    Constitutional: {General Appearance:76546::\"normal, well developed, well nourished, alert, in no distress and non-toxic and no overt pain behavior.\"}  Eyes: {Sclera:59838::\"anicteric\"}  HEENT: {Hearin::\"grossly intact\"}  Neck: {Neck:84341::\"supple, symmetric, trachea midline and no masses \"}  Pulmonary:{Respiratory effort:86798::\"even and unlabored\"}  Cardiovascular:{Examination of Extremities:22122::\"No edema or pitting edema present\"}  Skin:{Skin and Subcutaneous tissues:02155::\"Normal without rashes or lesions and well hydrated\"}  Psychiatric:{Mood and Affect:54392::\"Mood and affect appropriate\"}  Neurologic:{Cranial " "Nerves:00427::\"Cranial Nerves II-XII grossly intact\"}  Musculoskeletal:{Gait and Station:75195::\"normal\"}    Imaging  MRI LEFT SHOULDER @  2-5-25     INDICATION:   M24.812: Other specific joint derangements of left shoulder, not elsewhere classified. Orthopedic note from 1/23/2025 was reviewed. Patient has left shoulder pain, impingement syndrome with persistent pain after physical therapy. Rotator   cuff pathology suspected.     COMPARISON: Left shoulder plain films on 12/5/2024.     TECHNIQUE:   Multiplanar/multisequence MR of the left shoulder was performed.        FINDINGS:     SUBCUTANEOUS TISSUES: Normal     JOINT EFFUSION: None.     ACROMION PROCESS: Normal.     ROTATOR CUFF: Intact.     SUBACROMIAL/SUBDELTOID BURSA: Normal.     LONG HEAD OF BICEPS TENDON: Normal.     GLENOID LABRUM: Intact.     GLENOHUMERAL JOINT: Intact.     ACROMIOCLAVICULAR JOINT: There is mild osteoarthritis.     BONES: Normal.     IMPRESSION:     Mild acromioclavicular joint osteoarthritis, otherwise unremarkable left shoulder MR. Intact rotator cuff.    LEFT SHOULDER @  12-5-24     INDICATION:   Pain in left shoulder.      COMPARISON:  None.     VIEWS:  XR SHOULDER 2+ VW LEFT      FINDINGS:     There is no acute fracture or dislocation.     No significant degenerative changes.     No lytic or blastic osseous lesion.     Soft tissues are unremarkable. There is incidental minimal vacuum phenomenon at the glenohumeral joint space, nonspecific.     IMPRESSION:        No acute osseous abnormality.  Minimal vacuum phenomenon within the glenohumeral joint space.    {Imaging Review Statement:[id number]}         [1]   Patient Active Problem List  Diagnosis    Mixed hyperlipidemia    Elevated coronary artery calcium score    Rotator cuff tendonitis, left    Subacromial bursitis of left shoulder joint    Internal derangement of left shoulder   [2] No past medical history on file.  [3] No past surgical history on file.  [4]   Family " History  Problem Relation Name Age of Onset    Heart attack Mother      Hyperlipidemia Mother      Hypertension Mother      Heart disease Mother      Hyperlipidemia Brother     [5] No Known Allergies

## 2025-05-28 DIAGNOSIS — R93.1 ELEVATED CORONARY ARTERY CALCIUM SCORE: ICD-10-CM

## 2025-05-28 RX ORDER — LOSARTAN POTASSIUM 50 MG/1
50 TABLET ORAL DAILY
Qty: 90 TABLET | Refills: 3 | Status: SHIPPED | OUTPATIENT
Start: 2025-05-28

## 2025-05-30 NOTE — PROGRESS NOTES
"  Assessment  Diagnoses and all orders for this visit:    Internal derangement of left shoulder      Discussion and Plan:    Patient has an examination worrisome for rotator cuff pathology, given their lack of strength improvement with physical therapy and a subacromial corticosteroid injection they are indicated at this time for an MRI scan to evaluate for surgical pathology.  We will review the results of the study when it has been obtained and discuss further treatment options.  Patient has developed a home exercise program from therapy so he will continue to rehabilitate the shoulder as instructed by the therapist as we await the results of the MRI scan.  We will develop a plan of further treatment based on those results    Subjective:   Patient ID: Mic Richter is a 64 y.o. male      HPI  Patient present today for follow up evaluation of left shoulder impingement. Symptoms started in November 2025 without injury. Patent was provided with a CS injection at his last visit and referred to physical therapy.  Patient has been attending PT as instruction.  Today patient reports some improvement however he does have persistent pain.        The following portions of the patient's history were reviewed and updated as appropriate: allergies, current medications, past family history, past medical history, past social history, past surgical history and problem list.      Objective:  Ht 5' 9\" (1.753 m)   Wt 79.4 kg (175 lb)   BMI 25.84 kg/m²       Left Shoulder Exam     Tenderness   The patient is experiencing no tenderness.     Range of Motion   External rotation:  80   Forward flexion:  160   Internal rotation 0 degrees:  Mid thoracic     Muscle Strength   Abduction: 4/5   Internal rotation: 5/5   External rotation: 5/5     Tests   Bargre test: positive  Impingement: positive    Other   Erythema: absent  Sensation: normal  Pulse: present             Physical Exam  Vitals reviewed.   Constitutional:       " Follow up in 4 weeks    Continue current medications and therapy for chronic medical conditions.    Patient was advised importance of proper diet/nutrition in addition adequate hydration. Patient was encouraged moderate exercise program to include 30 minutes daily for 5 days of the week or 150 minutes weekly. Patient will follow-up with us as scheduled.      Appearance: He is well-developed.   HENT:      Head: Normocephalic.   Eyes:      Pupils: Pupils are equal, round, and reactive to light.   Pulmonary:      Effort: Pulmonary effort is normal.   Abdominal:      General: Abdomen is flat. There is no distension.   Skin:     General: Skin is warm and dry.           I have personally reviewed pertinent films in PACS and my interpretation is as follows.    Left shoulder x-rays demonstrates no fracture or dislocation, positive vacuum sign       Records Reviewed: Physical therapy notes demonstrating progress but also continuing to show weakness with abduction and external rotation despite focused strengthening    Scribe Attestation      I,:  Yeny Bullock MA am acting as a scribe while in the presence of the attending physician.:       I,:  Benja Velasquez MD personally performed the services described in this documentation    as scribed in my presence.:

## 2025-06-13 ENCOUNTER — HOSPITAL ENCOUNTER (OUTPATIENT)
Dept: RADIOLOGY | Facility: HOSPITAL | Age: 65
Discharge: HOME | End: 2025-06-13
Attending: ANESTHESIOLOGY
Payer: COMMERCIAL

## 2025-06-13 DIAGNOSIS — M54.12 LEFT CERVICAL RADICULOPATHY: ICD-10-CM

## 2025-06-13 PROCEDURE — 72141 MRI NECK SPINE W/O DYE: CPT

## 2025-06-19 ENCOUNTER — OFFICE VISIT (OUTPATIENT)
Dept: PAIN MEDICINE | Facility: CLINIC | Age: 65
End: 2025-06-19
Payer: COMMERCIAL

## 2025-06-19 VITALS — BODY MASS INDEX: 25.92 KG/M2 | WEIGHT: 175 LBS | TEMPERATURE: 98.1 F | HEART RATE: 65 BPM | HEIGHT: 69 IN

## 2025-06-19 DIAGNOSIS — M54.12 CERVICAL RADICULOPATHY: Primary | ICD-10-CM

## 2025-06-19 PROCEDURE — 99214 OFFICE O/P EST MOD 30 MIN: CPT | Performed by: NURSE PRACTITIONER

## 2025-06-19 NOTE — PROGRESS NOTES
Name: Mic Richter      : 1960      MRN: [id number]  Encounter Provider: CAROL Machado  Encounter Date: 2025   Encounter department: [facility] [place]  :  Assessment & Plan  Cervical radiculopathy    Mic Richter is a 64 y.o. male who presents for a follow up office visit in regards to Neck Pain. The patient has a history of neck pain and left shoulder/arm pain.  Patient presents today with ongoing pain that is located primarily in the left shoulder.  He does feel pain down the arm to the 3rd, 4th, and 5th fingers with certain movements such as abduction and internal rotation.  He was previously treated by physical therapy for the left shoulder and was given subacromial bursa injections by orthopedics which provided little relief.  Recently underwent MRI cervical spine which showed right foraminal disc osteophyte complexes with mild right foraminal stenosis C3-C4 C4-C5.  At C6-C7 there was a left foraminal protrusion but no significant stenosis. I contacted radiology who confirmed no stenosis at that level.  At this time, I will refer him for an EMG of the left upper extremity.  We will contact him with the results        Orders:    EMG 1 Limb; Future            My impressions and treatment recommendations were discussed in detail with the patient who verbalized understanding and had no further questions.  Discharge instructions were provided. I personally saw and examined the patient and I agree with the above discussed plan of care.    History of Present Illness     Mic Richter is a 64 y.o. male who presents for a follow up office visit in regards to Neck Pain. The patient has a history of neck pain and left shoulder/arm pain.  Patient's last office visit was May 23, 2025 which was his initial consultation.  He was ordered an MRI of the cervical spine.  Patient presents today with ongoing pain which is located in the left shoulder.  The pain radiates down his  "arm and into the 3rd, 4th, and 5th finger on the left hand with certain movements, such as shoulder abduction or internal rotation. He saw Dr. Velasquez January 23, 2025.  He was treated with subacromial bursa injections, which provided little relief.  He underwent an MRI of the left shoulder in February 2025 which showed mild osteoarthritis    The pain occurs on occasion/ with certain movements.  He describes it as sharp.  He does feel the pain interferes with his activities of daily living.  He had stopped PT in May because it was increasing his pain.  He is currently doing home exercises.    He had an MRI of the cervical spine since the last office visit which showed right foraminal disc osteophyte complexes with mild right foraminal stenosis 3 3-C4 C4-C5.  At C6/C7 there is a left foraminal protrusion    He takes Tylenol as needed for the pain        Medical History Reviewed by provider this encounter:     .  Past Medical History   Past Medical History[1]  Past Surgical History[2]  Family History[3]   reports that he has never smoked. He has never been exposed to tobacco smoke. He has never used smokeless tobacco. He reports current alcohol use of about 6.0 standard drinks of alcohol per week. He reports that he does not use drugs.  Current Outpatient Medications   Medication Instructions    aspirin (ECOTRIN LOW STRENGTH) 81 mg, Daily    ergocalciferol (VITAMIN D2) 50,000 Units, Weekly    ezetimibe (ZETIA) 10 mg, Oral, Daily    losartan (COZAAR) 50 mg, Oral, Daily    rosuvastatin (CRESTOR) 40 mg, Oral, Daily    Testosterone 10 MG/ACT (2%) GEL    Allergies[4]      Objective   Pulse 65   Temp 98.1 °F (36.7 °C)   Ht 5' 9\" (1.753 m) Comment: Verbal  Wt 79.4 kg (175 lb)   BMI 25.84 kg/m²         Physical Exam  Constitutional: normal, well developed, well nourished, alert, in no distress and non-toxic and no overt pain behavior.  Eyes: anicteric  HEENT: grossly intact  Neck: supple, symmetric, trachea midline and no " masses   Pulmonary: even and unlabored  Cardiovascular: No edema or pitting edema present  Skin: Normal without rashes or lesions and well hydrated  Psychiatric: Mood and affect appropriate  Neurologic: Cranial Nerves II-XII grossly intact  Musculoskeletal:     Cervical Spine Exam    Appearance:  Normal lordosis  Palpation/Tenderness:  no tenderness or spasm  Range of Motion:  Full range of motion with no pain or limitations in flexion, extension, lateral flexion and rotation  Motor Strength:  Left Arm Flexion  5/5  Left Arm Extension  5/5  Right Arm Flexion  5/5  Right Arm Extension  5/5  Left Finger Abduction  5/5  Right Finger Abduction  5/5  Left    5/5  Right   5/5      Shoulder Exam (left)    Appearance:  Normal with no swelling or bruising and no obvious joint deformity  Palpation/Tenderness:  Normal joint with no tenderness or effusions  Active Range of Motion:  Abduction: Moderately limited and Internal Rotation: No limitation and Moderately limited- both painful  Special Tests:  Empty Can Test:  positive  Neer's Impingement Test:  positive   XR SPINE CERVICAL COMPLETE 4 OR 5 VW NON INJURY @   05/23/2025.    CERVICAL SPINE     INDICATION:   Radiculopathy, cervical region.      COMPARISON: None.      Images: 5 views      FINDINGS:     No fracture.      Normal alignment without subluxation.     The intervertebral disc spaces are preserved. Multilevel facet arthropathy and uncovertebral joint hypertrophy.     Right C3-4 neural foraminal narrowing     Left C6-7 neural foraminal narrowing.     The prevertebral soft tissues are within normal limits.       The lung apices are clear.     IMPRESSION:        No acute osseous abnormality.     Degenerative changes as above.        MRI CERVICAL SPINE WITHOUT CONTRAST @  06/13/2025     INDICATION: M54.12: Radiculopathy, cervical region.     COMPARISON: Left cervical radiculopathy     TECHNIQUE:  Multiplanar, multisequence imaging of the cervical spine was  performed. .  Imaging performed on 1.5T MRI     IMAGE QUALITY:  Diagnostic     FINDINGS:     ALIGNMENT: Levoscoliotic curvature to the lower cervical spine. No spondylolisthesis. No compression fracture.  No subluxation.  No scoliosis.     MARROW SIGNAL:  Normal marrow signal is identified within the visualized bony structures.  No discrete marrow lesion.     CERVICAL AND VISUALIZED THORACIC CORD:  Normal signal within the visualized cord.     PREVERTEBRAL AND PARASPINAL SOFT TISSUES:  Normal.     VISUALIZED POSTERIOR FOSSA:  The visualized posterior fossa demonstrates no abnormal signal.     CERVICAL DISC SPACES:     C2-C3:  Normal.     C3-C4: Right foraminal disc osteophyte complex and uncovertebral hypertrophy with mild right foraminal stenosis.     C4-C5: Right foraminal disc osteophyte complex and uncovertebral hypertrophy with mild right foraminal stenosis.     C5-C6: Tiny central protrusion. No spinal canal or foraminal stenosis.     C6-C7: Left foraminal protrusion     C7-T1:  Normal.     UPPER THORACIC DISC SPACES:  Normal.     OTHER FINDINGS: Small polypoid lesion within the sphenoid sinus.     IMPRESSION:     Mild noncompressive degenerative discogenic disease.          Radiology Results Review: I have reviewed radiology reports from 6/13/25 including: MRI spine.           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

## 2025-07-01 ENCOUNTER — TELEPHONE (OUTPATIENT)
Dept: RADIOLOGY | Facility: CLINIC | Age: 65
End: 2025-07-01

## 2025-07-01 NOTE — TELEPHONE ENCOUNTER
"Pt reports his pain is intermittent and involves the top of his left shoulder. If he reaches his left arm outward like he's going to take a ticket he gets a sharp shooting pain from his shoulder down the arm to his hand. That pain is 8-9/10 and lasts 10-15 seconds. He gets pain if he tries to reach his left arm behind his back but it's less, only 4-5/10. He has numbness of his left 3rd, 4th and 5th fingers. He denies neck pain.     He's had an inj to the shoulder by Dr. De Luna and went for PT.  Dr. Vaz did xray and cervical MRI. He was recently seen by his CRNP would ordered an EMG which is scheduled to be done at [facility] on 7/21/25. She told the pt that she thought his shoulder was the problem and pt told Dr. De Luna who said \"absolutely not.\"       "

## 2025-07-01 NOTE — TELEPHONE ENCOUNTER
I looked at his MRI cervical spine images and he does have some foraminal stenosis on the left at C6-7.  Would recommend doing a ARTEMIO.  I can do that for him or I am sure Dr. Vaz would do that for him in [place] as well if he would like

## 2025-07-01 NOTE — TELEPHONE ENCOUNTER
Dr. Velasquez reached out about patient getting a second opinion here with me.  Please get update on current symptoms that he is experiencing

## 2025-07-01 NOTE — TELEPHONE ENCOUNTER
S/w Pt, advised of the same. Pt verbalized understanding and would like to proceed with ARTEMIO with FQ. Pt does take 81mg ASA daily, but is not formally prescribed by provider. Please contact for scheduling.    none

## 2025-07-02 NOTE — TELEPHONE ENCOUNTER
Left message for patient to contact the scheduling office at [phone] to schedule their procedure.

## 2025-07-10 ENCOUNTER — TELEPHONE (OUTPATIENT)
Dept: PAIN MEDICINE | Facility: CLINIC | Age: 65
End: 2025-07-10

## 2025-07-10 ENCOUNTER — TELEPHONE (OUTPATIENT)
Age: 65
End: 2025-07-10

## 2025-07-10 DIAGNOSIS — E78.2 MIXED HYPERLIPIDEMIA: ICD-10-CM

## 2025-07-10 DIAGNOSIS — R93.1 ELEVATED CORONARY ARTERY CALCIUM SCORE: Primary | ICD-10-CM

## 2025-07-10 NOTE — TELEPHONE ENCOUNTER
This patient is being considered for a neuraxial injection for the management of their pain. However, the patient is currently on an anticoagulant per your orders. The [employer] Society of [employer] Anest[employer] Guideline on neuraxial procedures and anti-coagulation indicates that medication should be held as follows: Aspirin 6 days prior to injection.   Please advise if you ok the hold of this medication.    Thank you,    Alexander Peters  Procedure   [facility]

## 2025-07-10 NOTE — TELEPHONE ENCOUNTER
Caller: Mic Richter     Doctor: Dr. Bender    Reason for call: Patient is inquiring if he would need to get blood work done for his annual checkup.     Call back#: [phone]

## 2025-07-11 NOTE — TELEPHONE ENCOUNTER
S/w Pt. Pt is scheduled for ARTEMIO on 08/21. Pt instructed last dose of Aspirin on 08/14 per SPA 6 day hold guidelines.   Pt denies taking other anticoagulants and NSAIDs. Pt verbalized understanding of preoperative instructions.

## 2025-07-16 ENCOUNTER — APPOINTMENT (OUTPATIENT)
Dept: LAB | Facility: CLINIC | Age: 65
End: 2025-07-16
Payer: COMMERCIAL

## 2025-07-16 DIAGNOSIS — Z12.5 SCREENING FOR PROSTATE CANCER: ICD-10-CM

## 2025-07-16 LAB
ALBUMIN SERPL BCG-MCNC: 4.3 G/DL (ref 3.5–5)
ALP SERPL-CCNC: 42 U/L (ref 34–104)
ALT SERPL W P-5'-P-CCNC: 21 U/L (ref 7–52)
ANION GAP SERPL CALCULATED.3IONS-SCNC: 8 MMOL/L (ref 4–13)
AST SERPL W P-5'-P-CCNC: 26 U/L (ref 13–39)
BILIRUB SERPL-MCNC: 0.59 MG/DL (ref 0.2–1)
BUN SERPL-MCNC: 17 MG/DL (ref 5–25)
CALCIUM SERPL-MCNC: 9.1 MG/DL (ref 8.4–10.2)
CHLORIDE SERPL-SCNC: 105 MMOL/L (ref 96–108)
CHOLEST SERPL-MCNC: 130 MG/DL (ref ?–200)
CO2 SERPL-SCNC: 27 MMOL/L (ref 21–32)
CREAT SERPL-MCNC: 0.85 MG/DL (ref 0.6–1.3)
GFR SERPL CREATININE-BSD FRML MDRD: 93 ML/MIN/1.73SQ M
GLUCOSE P FAST SERPL-MCNC: 91 MG/DL (ref 65–99)
HDLC SERPL-MCNC: 43 MG/DL
LDLC SERPL CALC-MCNC: 73 MG/DL (ref 0–100)
POTASSIUM SERPL-SCNC: 4.6 MMOL/L (ref 3.5–5.3)
PROT SERPL-MCNC: 6.9 G/DL (ref 6.4–8.4)
PSA SERPL-MCNC: 3.29 NG/ML (ref 0–4)
SODIUM SERPL-SCNC: 140 MMOL/L (ref 135–147)
TRIGL SERPL-MCNC: 70 MG/DL (ref ?–150)

## 2025-07-16 PROCEDURE — 80061 LIPID PANEL: CPT | Performed by: INTERNAL MEDICINE

## 2025-07-16 PROCEDURE — 36415 COLL VENOUS BLD VENIPUNCTURE: CPT

## 2025-07-16 PROCEDURE — 80053 COMPREHEN METABOLIC PANEL: CPT | Performed by: INTERNAL MEDICINE

## 2025-07-16 PROCEDURE — G0103 PSA SCREENING: HCPCS

## 2025-07-18 ENCOUNTER — RESULTS FOLLOW-UP (OUTPATIENT)
Dept: UROLOGY | Facility: MEDICAL CENTER | Age: 65
End: 2025-07-18

## 2025-07-18 DIAGNOSIS — R97.20 INCREASED PROSTATE SPECIFIC ANTIGEN (PSA) VELOCITY: Primary | ICD-10-CM

## 2025-07-21 NOTE — TELEPHONE ENCOUNTER
Called/spoke to patient and reviewed all details of below note from REGIS Schilling.     ----- Message from Shakir Gunderson PA-C sent at 7/18/2025  4:58 PM EDT -----  Please call the patient advise him I reviewed his most recent PSA and it returned at a value of 3.295.  This increased a whole point compared to his PSA a year ago.  Would recommend repeating a PSA   closer to the time our office appointment on 10/1.  Patient should refrain from sexual activity, masturbation, bike riding, lawn more riding, or heavy lifting up to 48-hour prior to repeating PSA   testing.  New PSA order placed to be completed 1 week prior to her upcoming appointment.  ----- Message -----  From: Lab, Background User  Sent: 7/16/2025   3:43 PM EDT  To: Shakir Gunderson PA-C

## 2025-08-21 PROBLEM — M54.12 CERVICAL RADICULOPATHY: Status: ACTIVE | Noted: 2025-08-21

## 2025-08-22 ENCOUNTER — OFFICE VISIT (OUTPATIENT)
Age: 65
End: 2025-08-22
Payer: COMMERCIAL

## 2025-08-22 VITALS
WEIGHT: 177 LBS | OXYGEN SATURATION: 96 % | DIASTOLIC BLOOD PRESSURE: 76 MMHG | BODY MASS INDEX: 26.22 KG/M2 | HEIGHT: 69 IN | HEART RATE: 57 BPM | SYSTOLIC BLOOD PRESSURE: 140 MMHG

## 2025-08-22 DIAGNOSIS — R93.1 ELEVATED CORONARY ARTERY CALCIUM SCORE: Primary | ICD-10-CM

## 2025-08-22 DIAGNOSIS — R94.31 ABNORMAL ECG: ICD-10-CM

## 2025-08-22 DIAGNOSIS — I10 HYPERTENSION, UNSPECIFIED TYPE: ICD-10-CM

## 2025-08-22 PROCEDURE — 99213 OFFICE O/P EST LOW 20 MIN: CPT | Performed by: INTERNAL MEDICINE

## 2025-08-22 PROCEDURE — 93000 ELECTROCARDIOGRAM COMPLETE: CPT | Performed by: INTERNAL MEDICINE
